# Patient Record
Sex: FEMALE | Race: BLACK OR AFRICAN AMERICAN | Employment: OTHER | URBAN - METROPOLITAN AREA
[De-identification: names, ages, dates, MRNs, and addresses within clinical notes are randomized per-mention and may not be internally consistent; named-entity substitution may affect disease eponyms.]

---

## 2019-04-22 ENCOUNTER — HOSPITAL ENCOUNTER (OUTPATIENT)
Age: 69
Setting detail: OUTPATIENT SURGERY
Discharge: HOME | End: 2019-04-22
Attending: SPECIALIST | Admitting: SPECIALIST
Payer: MEDICARE

## 2019-04-22 ENCOUNTER — ANESTHESIA (OUTPATIENT)
Dept: ENDOSCOPY | Age: 69
End: 2019-04-22
Payer: MEDICARE

## 2019-04-22 ENCOUNTER — ANESTHESIA EVENT (OUTPATIENT)
Dept: ENDOSCOPY | Age: 69
End: 2019-04-22
Payer: MEDICARE

## 2019-04-22 VITALS
TEMPERATURE: 96.8 F | OXYGEN SATURATION: 99 % | HEIGHT: 66 IN | DIASTOLIC BLOOD PRESSURE: 47 MMHG | SYSTOLIC BLOOD PRESSURE: 121 MMHG | WEIGHT: 234 LBS | BODY MASS INDEX: 37.61 KG/M2

## 2019-04-22 PROCEDURE — 74011250636 HC RX REV CODE- 250/636

## 2019-04-22 PROCEDURE — 76060000032 HC ANESTHESIA 0.5 TO 1 HR: Performed by: SPECIALIST

## 2019-04-22 PROCEDURE — 77030009426 HC FCPS BIOP ENDOSC BSC -B: Performed by: SPECIALIST

## 2019-04-22 PROCEDURE — 74011250636 HC RX REV CODE- 250/636: Performed by: SPECIALIST

## 2019-04-22 PROCEDURE — 76040000007: Performed by: SPECIALIST

## 2019-04-22 PROCEDURE — 77030013992 HC SNR POLYP ENDOSC BSC -B: Performed by: SPECIALIST

## 2019-04-22 PROCEDURE — 77030027957 HC TBNG IRR ENDOGTR BUSS -B: Performed by: SPECIALIST

## 2019-04-22 PROCEDURE — 88305 TISSUE EXAM BY PATHOLOGIST: CPT

## 2019-04-22 RX ORDER — FLUTICASONE FUROATE AND VILANTEROL 200; 25 UG/1; UG/1
1 POWDER RESPIRATORY (INHALATION)
COMMUNITY
End: 2021-11-02

## 2019-04-22 RX ORDER — GUAIFENESIN 100 MG/5ML
81 LIQUID (ML) ORAL DAILY
COMMUNITY
End: 2021-11-19

## 2019-04-22 RX ORDER — LATANOPROST 50 UG/ML
1 SOLUTION/ DROPS OPHTHALMIC
COMMUNITY

## 2019-04-22 RX ORDER — LIDOCAINE HYDROCHLORIDE 20 MG/ML
INJECTION, SOLUTION EPIDURAL; INFILTRATION; INTRACAUDAL; PERINEURAL AS NEEDED
Status: DISCONTINUED | OUTPATIENT
Start: 2019-04-22 | End: 2019-04-22 | Stop reason: HOSPADM

## 2019-04-22 RX ORDER — MIDAZOLAM HYDROCHLORIDE 1 MG/ML
.25-1 INJECTION, SOLUTION INTRAMUSCULAR; INTRAVENOUS
Status: DISCONTINUED | OUTPATIENT
Start: 2019-04-22 | End: 2019-04-22 | Stop reason: HOSPADM

## 2019-04-22 RX ORDER — DEXTROMETHORPHAN/PSEUDOEPHED 2.5-7.5/.8
1.2 DROPS ORAL
Status: DISCONTINUED | OUTPATIENT
Start: 2019-04-22 | End: 2019-04-22 | Stop reason: HOSPADM

## 2019-04-22 RX ORDER — PROPOFOL 10 MG/ML
INJECTION, EMULSION INTRAVENOUS AS NEEDED
Status: DISCONTINUED | OUTPATIENT
Start: 2019-04-22 | End: 2019-04-22 | Stop reason: HOSPADM

## 2019-04-22 RX ORDER — EPINEPHRINE 0.1 MG/ML
1 INJECTION INTRACARDIAC; INTRAVENOUS
Status: DISCONTINUED | OUTPATIENT
Start: 2019-04-22 | End: 2019-04-22 | Stop reason: HOSPADM

## 2019-04-22 RX ORDER — SPIRONOLACTONE 25 MG/1
25 TABLET ORAL DAILY
COMMUNITY

## 2019-04-22 RX ORDER — ATROPINE SULFATE 0.1 MG/ML
0.5 INJECTION INTRAVENOUS
Status: DISCONTINUED | OUTPATIENT
Start: 2019-04-22 | End: 2019-04-22 | Stop reason: HOSPADM

## 2019-04-22 RX ORDER — SODIUM CHLORIDE 0.9 % (FLUSH) 0.9 %
5-40 SYRINGE (ML) INJECTION EVERY 8 HOURS
Status: DISCONTINUED | OUTPATIENT
Start: 2019-04-22 | End: 2019-04-22 | Stop reason: HOSPADM

## 2019-04-22 RX ORDER — SODIUM CHLORIDE 0.9 % (FLUSH) 0.9 %
5-40 SYRINGE (ML) INJECTION AS NEEDED
Status: DISCONTINUED | OUTPATIENT
Start: 2019-04-22 | End: 2019-04-22 | Stop reason: HOSPADM

## 2019-04-22 RX ORDER — FENTANYL CITRATE 50 UG/ML
200 INJECTION, SOLUTION INTRAMUSCULAR; INTRAVENOUS
Status: DISCONTINUED | OUTPATIENT
Start: 2019-04-22 | End: 2019-04-22 | Stop reason: HOSPADM

## 2019-04-22 RX ORDER — SODIUM CHLORIDE 9 MG/ML
INJECTION, SOLUTION INTRAVENOUS
Status: DISCONTINUED | OUTPATIENT
Start: 2019-04-22 | End: 2019-04-22 | Stop reason: HOSPADM

## 2019-04-22 RX ORDER — NALOXONE HYDROCHLORIDE 0.4 MG/ML
0.4 INJECTION, SOLUTION INTRAMUSCULAR; INTRAVENOUS; SUBCUTANEOUS
Status: DISCONTINUED | OUTPATIENT
Start: 2019-04-22 | End: 2019-04-22 | Stop reason: HOSPADM

## 2019-04-22 RX ORDER — PRAVASTATIN SODIUM 10 MG/1
TABLET ORAL
COMMUNITY

## 2019-04-22 RX ORDER — SODIUM CHLORIDE 9 MG/ML
50 INJECTION, SOLUTION INTRAVENOUS CONTINUOUS
Status: DISCONTINUED | OUTPATIENT
Start: 2019-04-22 | End: 2019-04-22 | Stop reason: HOSPADM

## 2019-04-22 RX ORDER — FLUMAZENIL 0.1 MG/ML
0.2 INJECTION INTRAVENOUS
Status: DISCONTINUED | OUTPATIENT
Start: 2019-04-22 | End: 2019-04-22 | Stop reason: HOSPADM

## 2019-04-22 RX ORDER — LORAZEPAM 2 MG/ML
2 INJECTION INTRAMUSCULAR AS NEEDED
Status: DISCONTINUED | OUTPATIENT
Start: 2019-04-22 | End: 2019-04-22 | Stop reason: HOSPADM

## 2019-04-22 RX ADMIN — PROPOFOL 50 MG: 10 INJECTION, EMULSION INTRAVENOUS at 08:07

## 2019-04-22 RX ADMIN — PROPOFOL 50 MG: 10 INJECTION, EMULSION INTRAVENOUS at 07:55

## 2019-04-22 RX ADMIN — PROPOFOL 30 MG: 10 INJECTION, EMULSION INTRAVENOUS at 07:37

## 2019-04-22 RX ADMIN — SODIUM CHLORIDE: 9 INJECTION, SOLUTION INTRAVENOUS at 07:31

## 2019-04-22 RX ADMIN — PROPOFOL 50 MG: 10 INJECTION, EMULSION INTRAVENOUS at 07:40

## 2019-04-22 RX ADMIN — PROPOFOL 100 MG: 10 INJECTION, EMULSION INTRAVENOUS at 07:35

## 2019-04-22 RX ADMIN — PROPOFOL 50 MG: 10 INJECTION, EMULSION INTRAVENOUS at 08:03

## 2019-04-22 RX ADMIN — PROPOFOL 50 MG: 10 INJECTION, EMULSION INTRAVENOUS at 07:50

## 2019-04-22 RX ADMIN — PROPOFOL 50 MG: 10 INJECTION, EMULSION INTRAVENOUS at 07:48

## 2019-04-22 RX ADMIN — PROPOFOL 50 MG: 10 INJECTION, EMULSION INTRAVENOUS at 07:43

## 2019-04-22 RX ADMIN — PROPOFOL 50 MG: 10 INJECTION, EMULSION INTRAVENOUS at 07:58

## 2019-04-22 RX ADMIN — PROPOFOL 50 MG: 10 INJECTION, EMULSION INTRAVENOUS at 07:46

## 2019-04-22 RX ADMIN — LIDOCAINE HYDROCHLORIDE 40 MG: 20 INJECTION, SOLUTION EPIDURAL; INFILTRATION; INTRACAUDAL; PERINEURAL at 07:35

## 2019-04-22 RX ADMIN — PROPOFOL 50 MG: 10 INJECTION, EMULSION INTRAVENOUS at 07:52

## 2019-04-22 NOTE — DISCHARGE INSTRUCTIONS
Jose Marina  [id number]  1950    COLON DISCHARGE INSTRUCTIONS  Discomfort:  Redness at IV site- apply warm compress to area; if redness or soreness persist- contact your physician  There may be a slight amount of blood passed from the rectum  Gaseous discomfort- walking, belching will help relieve any discomfort  You may not operate a vehicle for 12 hours  You may not engage in an occupation involving machinery or appliances for rest of today  You may not drink alcoholic beverages for at least 12 hours  Avoid making any critical decisions for at least 24 hour  DIET:   Regular diet. - however -  remember your colon is empty and a heavy meal will produce gas. Avoid these foods:  vegetables, fried / greasy foods, carbonated drinks for today. ACTIVITY:  You may resume your normal daily activities it is recommended that you spend the remainder of the day resting -  avoid any strenuous activity. CALL M.D. ANY SIGN OF:  Increasing pain, nausea, vomiting  Abdominal distension (swelling)  New increased bleeding (oral or rectal)  Fever (chills)  Pain in chest area  Bloody discharge from nose or mouth  Shortness of breath    You may not  take any Advil, Aspirin, Ibuprofen, Motrin, Aleve, Goodys, or any similar pain or arthritis products for 10 days, ONLY  Tylenol as needed for pain.       Follow-up Instructions:   Call Dr. Isak Ballard  Results of procedure / biopsy in 10-14days   Office telephone for problems or questions [phone]    Recommendation for next colonscopy in 3 years  If < 10 years, reason:  above average risk patient   Start Anusol or generic 2 x  A day for 10 days  Make appointment with Dr. Shahab Garay [phone]

## 2019-04-22 NOTE — H&P
Pre-endoscopy H and P for Colonoscopy    The patient was seen and examined. Date of last colonoscopy: 2016, Polyps  Yes      The airway was assessed and documented. The problem list, past medical history, and medications were reviewed. There is no problem list on file for this patient.     Social History     Socioeconomic History    Marital status: SINGLE     Spouse name: Not on file    Number of children: Not on file    Years of education: Not on file    Highest education level: Not on file   Occupational History    Not on file   Social Needs    Financial resource strain: Not on file    Food insecurity:     Worry: Not on file     Inability: Not on file    Transportation needs:     Medical: Not on file     Non-medical: Not on file   Tobacco Use    Smoking status: Former Smoker    Tobacco comment: quit in 1983 or 80   Substance and Sexual Activity    Alcohol use: Yes     Comment: occasionally    Drug use: Not on file    Sexual activity: Not on file   Lifestyle    Physical activity:     Days per week: Not on file     Minutes per session: Not on file    Stress: Not on file   Relationships    Social connections:     Talks on phone: Not on file     Gets together: Not on file     Attends [employer] service: Not on file     Active member of club or organization: Not on file     Attends meetings of clubs or organizations: Not on file     Relationship status: Not on file    Intimate partner violence:     Fear of current or ex partner: Not on file     Emotionally abused: Not on file     Physically abused: Not on file     Forced sexual activity: Not on file   Other Topics Concern    Not on file   Social History Narrative    Not on file     Past Medical History:   Diagnosis Date    Asthma     Cancer ([facility] [address]     cervical - radiation therapy/2 radiation implants    Hypertension     Ill-defined condition     overweight per pt    Ill-defined condition     sarcoidosis - lungs     The patient has a family history of na    Prior to Admission Medications   Prescriptions Last Dose Informant Patient Reported? Taking? ALBUTEROL SULFATE IN 1/22/2019 at Unknown time  Yes Yes   Sig: Take 2 Puffs by inhalation as needed. HFA   CALCIUM CARBONATE/VITAMIN D3 (CALCIUM + D PO) Not Taking at Unknown time  Yes No   Sig: Take  by mouth. Takes one po at night. OTHER Not Taking at Unknown time  Yes No   Sig: Uses another as needed inhaler. Will bring information in. POTASSIUM CHLORIDE PO 4/20/2019  Yes No   Sig: Take 20 mEq by mouth daily. ER   aspirin 81 mg chewable tablet 4/20/2019  Yes Yes   Sig: Take 81 mg by mouth daily. betaxolol (BETOPTIC-S) 0.25 % ophthalmic suspension Not Taking at Unknown time  Yes No   Sig: Administer 1 Drop to both eyes daily. cholecalciferol (VITAMIN D3) 1,000 unit tablet Not Taking at Unknown time  Yes No   Sig: Take 1,000 Units by mouth daily. [facility]) 625 mg tablet Not Taking at Unknown time  Yes No   Sig: Take 1,875 mg by mouth daily. fluticasone furoate-vilanterol (BREO ELLIPTA) 200-25 mcg/dose inhaler 4/20/2019  Yes Yes   Sig: Take 1 Puff by inhalation daily. hydrochlorothiazide (HYDRODIURIL) 25 mg tablet Not Taking at Unknown time  Yes No   Sig: Take 25 mg by mouth daily. hydrocortisone (ANUSOL-HC) 2.5 % rectal cream Not Taking at Unknown time  No No   Sig: Apply with gloved finger BID for 7 days then as neede for bleeding or pain   latanoprost (XALATAN) 0.005 % ophthalmic solution 4/20/2019  Yes Yes   Sig: Administer 1 Drop to both eyes nightly. lisinopril (PRINIVIL, ZESTRIL) 10 mg tablet 4/20/2019  Yes No   Sig: Take 10 mg by mouth daily. pravastatin (PRAVACHOL) 10 mg tablet 4/20/2019  Yes Yes   Sig: Take  by mouth nightly. spironolactone (ALDACTONE) 25 mg tablet 4/20/2019  Yes Yes   Sig: Take 25 mg by mouth daily. vitamin E (AQUA GEMS) 400 unit capsule Not Taking at Unknown time  Yes No   Sig: Take 800 Units by mouth daily.    zafirlukast (ACCOLATE) 20 mg tablet 4/20/2019  Yes No   Sig: Take 20 mg by mouth two (2) times a day. Facility-Administered Medications: None         The review of systems is:  negative for shortness of breath or chest pain      The heart, lungs and mental status were satisfactory for the administration of MAC sedation and for the procedure. Mallampati score: See Anesthesia. I discussed with the patient the objectives, risks, consequences and alternatives to the procedure. Plan: Endoscopic procedure with MAC sedation. Massiel Taylor MD  4/22/2019  7:31 AM    Pre-endoscopy H and P for Colonoscopy    The patient was seen and examined. Date of last colonoscopy: 2016, Polyps  Yes      The airway was assessed and documented. The problem list, past medical history, and medications were reviewed. There is no problem list on file for this patient.     Social History     Socioeconomic History    Marital status: SINGLE     Spouse name: Not on file    Number of children: Not on file    Years of education: Not on file    Highest education level: Not on file   Occupational History    Not on file   Social Needs    Financial resource strain: Not on file    Food insecurity:     Worry: Not on file     Inability: Not on file    Transportation needs:     Medical: Not on file     Non-medical: Not on file   Tobacco Use    Smoking status: Former Smoker    Tobacco comment: quit in 1983 or 80   Substance and Sexual Activity    Alcohol use: Yes     Comment: occasionally    Drug use: Not on file    Sexual activity: Not on file   Lifestyle    Physical activity:     Days per week: Not on file     Minutes per session: Not on file    Stress: Not on file   Relationships    Social connections:     Talks on phone: Not on file     Gets together: Not on file     Attends [employer] service: Not on file     Active member of club or organization: Not on file     Attends meetings of clubs or organizations: Not on file     Relationship status: Not on file    Intimate partner violence:     Fear of current or ex partner: Not on file     Emotionally abused: Not on file     Physically abused: Not on file     Forced sexual activity: Not on file   Other Topics Concern    Not on file   Social History Narrative    Not on file     Past Medical History:   Diagnosis Date    Asthma     Cancer ([facility] [address]     cervical - radiation therapy/2 radiation implants    Hypertension     Ill-defined condition     overweight per pt    Ill-defined condition     sarcoidosis - lungs     The patient has a family history of na    Prior to Admission Medications   Prescriptions Last Dose Informant Patient Reported? Taking? ALBUTEROL SULFATE IN 1/22/2019 at Unknown time  Yes Yes   Sig: Take 2 Puffs by inhalation as needed. HFA   CALCIUM CARBONATE/VITAMIN D3 (CALCIUM + D PO) Not Taking at Unknown time  Yes No   Sig: Take  by mouth. Takes one po at night. OTHER Not Taking at Unknown time  Yes No   Sig: Uses another as needed inhaler. Will bring information in. POTASSIUM CHLORIDE PO 4/20/2019  Yes No   Sig: Take 20 mEq by mouth daily. ER   aspirin 81 mg chewable tablet 4/20/2019  Yes Yes   Sig: Take 81 mg by mouth daily. betaxolol (BETOPTIC-S) 0.25 % ophthalmic suspension Not Taking at Unknown time  Yes No   Sig: Administer 1 Drop to both eyes daily. cholecalciferol (VITAMIN D3) 1,000 unit tablet Not Taking at Unknown time  Yes No   Sig: Take 1,000 Units by mouth daily. [facility]) 625 mg tablet Not Taking at Unknown time  Yes No   Sig: Take 1,875 mg by mouth daily. fluticasone furoate-vilanterol (BREO ELLIPTA) 200-25 mcg/dose inhaler 4/20/2019  Yes Yes   Sig: Take 1 Puff by inhalation daily. hydrochlorothiazide (HYDRODIURIL) 25 mg tablet Not Taking at Unknown time  Yes No   Sig: Take 25 mg by mouth daily.    hydrocortisone (ANUSOL-HC) 2.5 % rectal cream Not Taking at Unknown time  No No   Sig: Apply with gloved finger BID for 7 days then as neede for bleeding or pain   latanoprost (XALATAN) 0.005 % ophthalmic solution 4/20/2019  Yes Yes   Sig: Administer 1 Drop to both eyes nightly. lisinopril (PRINIVIL, ZESTRIL) 10 mg tablet 4/20/2019  Yes No   Sig: Take 10 mg by mouth daily. pravastatin (PRAVACHOL) 10 mg tablet 4/20/2019  Yes Yes   Sig: Take  by mouth nightly. spironolactone (ALDACTONE) 25 mg tablet 4/20/2019  Yes Yes   Sig: Take 25 mg by mouth daily. vitamin E (AQUA GEMS) 400 unit capsule Not Taking at Unknown time  Yes No   Sig: Take 800 Units by mouth daily. zafirlukast (ACCOLATE) 20 mg tablet 4/20/2019  Yes No   Sig: Take 20 mg by mouth two (2) times a day. Facility-Administered Medications: None         The review of systems is:  negative for shortness of breath or chest pain      The heart, lungs and mental status were satisfactory for the administration of MAC sedation and for the procedure. Mallampati score: See Anesthesia. I discussed with the patient the objectives, risks, consequences and alternatives to the procedure. Plan: Endoscopic procedure with MAC sedation.     Kervin Hauser MD  4/22/2019  7:31 AM

## 2019-04-22 NOTE — ROUTINE PROCESS
Alexia Vahe  1950  [id number]    Situation:  Verbal report received from: Orval Sacks  Procedure: Procedure(s):  COLONOSCOPY  ENDOSCOPIC POLYPECTOMY  COLON BIOPSY    Background:    Preoperative diagnosis: HX COLON POLYPS  Postoperative diagnosis: 1. External Hemorrhoids  2. Severe Distal Sigmoid Tortuosity   3. Rectal Polyp  4. Ascending Colon Polyps x 2  5. Rectal Ulcer    :  Dr. Asa Han  Assistant(s): Endoscopy Technician-1: Celsa Keyes  Endoscopy RN-1: Desiree Moran    Specimens:   ID Type Source Tests Collected by Time Destination   1 : Proximal rectal polyp Preservative Rectum  Frankie Escobar MD 4/22/2019 2370 Pathology   2 : ascending colon polyp Preservative Colon, Ascending  Frankie Escobar MD 4/22/2019 1256 Pathology   3 : distal Rectal Ulcer bx Preservative Rectum  Frankie Escobar MD 4/22/2019 1435 Pathology     H. Pylori  no    Assessment:  Intra-procedure medications     Anesthesia gave intra-procedure sedation and medications, see anesthesia flow sheet yes    Intravenous fluids: NS@ KVO     Vital signs stable   yes    Abdominal assessment: round and soft  yes    Recommendation:  Discharge patient per MD order yes .   Return to floor na   Family or Friend fam  Permission to share finding with family or friend yes

## 2019-04-22 NOTE — PERIOP NOTES

## 2019-04-22 NOTE — ANESTHESIA PREPROCEDURE EVALUATION
Relevant Problems   No relevant active problems       Anesthetic History               Review of Systems / Medical History  Patient summary reviewed, nursing notes reviewed and pertinent labs reviewed    Pulmonary            Asthma     Comments: sarcoid   Neuro/Psych              Cardiovascular    Hypertension              Exercise tolerance: >4 METS     GI/Hepatic/Renal                Endo/Other  Within defined limits           Other Findings            Physical Exam    Airway  Mallampati: I  TM Distance: > 6 cm  Neck ROM: normal range of motion   Mouth opening: Normal     Cardiovascular    Rhythm: regular  Rate: normal         Dental  No notable dental hx       Pulmonary  Breath sounds clear to auscultation               Abdominal         Other Findings            Anesthetic Plan    ASA: 3  Anesthesia type: MAC          Induction: Intravenous  Anesthetic plan and risks discussed with: Patient

## 2019-04-22 NOTE — PROCEDURES
Colonoscopy Procedure Note    Indications:   Personal history of colon polyps (screening only), s/p radiation for cervical ca patient not sure she had MELINDA and BSO, hx of radiation changes in rectum  Referring Physician: Julio Almeida, Not On File   Anesthesia/Sedation:DIMA  Endoscopist:  Dr. Coyle Smoker  Assistant:  Endoscopy Technician-1: Christina Salinas  Endoscopy RN-1: Alexander Mckinney  Surgical Assistant: None  Implants: None    Preoperative diagnosis: HX COLON POLYPS    Postoperative diagnosis: 1. External Hemorrhoids  2. Severe Distal Sigmoid Tortuosity   3. Rectal Polyp  4. Ascending Colon Polyps x 2  5. Rectal Ulcer      Procedure in Detail:  Informed consent was obtained for the procedure, including sedation. Risks of perforation, hemorrhage, adverse drug reaction, and aspiration were discussed. The patient was placed in the left lateral decubitus position. Based on the pre-procedure assessment, including review of the patient's medical history, medications, allergies, and review of systems, she had been deemed to be an appropriate candidate for moderate sedation; she was therefore sedated with the medications listed above. The patient was monitored continuously with ECG tracing, pulse oximetry, blood pressure monitoring, and direct observations. A rectal examination was performed. The [id number] was inserted into the rectum and advanced under direct vision to the terminal ileum. The quality of the colonic preparation was excellent. A careful inspection was made as the colonoscope was withdrawn, including a retroflexed view of the rectum; findings and interventions are described below.       Findings:   Rectum: easy friability w/o vascular ectasias, chronic ulcer just above pectinate lineat 4 o'clock in LLD position- Bx, large nontender external hemorrhoid; question of 3 mm polyp at pectinate line not removed -refer to colorectal; 3 mm polyp of proximal rectum removed with hot snare  Sigmoid:very fixed tortuous but short segment of distal rectosigmoid only able to negotiate with thin colonoscope  Descending Colon: normal  Transverse Colon: normal  Ascending Colon: 3 mm polyp removed with hot snare and 2 mm polyp removed with cold forceps submitted together  Cecum: normal  Terminal Ileum: normal    Specimens:     see above    EBL: None    Complications: None; patient tolerated the procedure well. Recommendations:     - Await pathology. - Repeat colonoscopy in 5 years.      - If < 10 years, reason: above average risk patient     - Refer to colorectal surgey regardin anal canal polyp, ulcer and external hemorrhoid    Signed By: Girish Mitchell MD                        April 22, 2019

## 2019-04-22 NOTE — ANESTHESIA POSTPROCEDURE EVALUATION
Post-Anesthesia Evaluation and Assessment Patient: Nadine Condon MRN: [id number]  SSN: [id number] YOB: 1950  Age: 76 y.o. Sex: female I have evaluated the patient and they are stable and ready for discharge from the PACU. Cardiovascular Function/Vital Signs Visit Vitals /57 Pulse 77 Temp 36 °C (96.8 °F) Resp 18 Ht 5' 6\" (1.676 m) Wt 106.1 kg (234 lb) SpO2 100% BMI 37.77 kg/m² Patient is status post MAC anesthesia for Procedure(s): 
COLONOSCOPY 
ENDOSCOPIC POLYPECTOMY 
COLON BIOPSY. Nausea/Vomiting: None Postoperative hydration reviewed and adequate. Pain: 
Pain Scale 1: Numeric (0 - 10) (04/22/19 0818) Pain Intensity 1: 0 (04/22/19 0819) Managed Neurological Status: At baseline Mental Status, Level of Consciousness: Alert and  oriented to person, place, and time Pulmonary Status:  
O2 Device: Nasal cannula (04/22/19 0803) Adequate oxygenation and airway patent Complications related to anesthesia: None Post-anesthesia assessment completed. No concerns Signed By: Priscila Montero MD   
 April 22, 2019 Procedure(s): 
COLONOSCOPY 
ENDOSCOPIC POLYPECTOMY 
COLON BIOPSY. MAC 
 
<BSHSIANPOST> Vitals Value Taken Time /64 4/22/2019  8:20 AM  
Temp Pulse 75 4/22/2019  8:24 AM  
Resp 15 4/22/2019  8:24 AM  
SpO2 99 % 4/22/2019  8:24 AM  
Vitals shown include unvalidated device data.

## 2019-05-17 ENCOUNTER — HOSPITAL ENCOUNTER (OUTPATIENT)
Dept: PREADMISSION TESTING | Age: 69
Discharge: HOME | End: 2019-05-17
Payer: MEDICARE

## 2019-05-17 VITALS
WEIGHT: 226.25 LBS | HEART RATE: 89 BPM | SYSTOLIC BLOOD PRESSURE: 160 MMHG | DIASTOLIC BLOOD PRESSURE: 74 MMHG | HEIGHT: 66 IN | BODY MASS INDEX: 36.36 KG/M2 | RESPIRATION RATE: 20 BRPM | TEMPERATURE: 98.2 F

## 2019-05-17 LAB
ANION GAP SERPL CALC-SCNC: 6 MMOL/L (ref 5–15)
BASOPHILS # BLD: 0 K/UL (ref 0–0.1)
BASOPHILS NFR BLD: 0 % (ref 0–1)
BUN SERPL-MCNC: 11 MG/DL (ref 6–20)
BUN/CREAT SERPL: 15 (ref 12–20)
CALCIUM SERPL-MCNC: 9.1 MG/DL (ref 8.5–10.1)
CHLORIDE SERPL-SCNC: 107 MMOL/L (ref 97–108)
CO2 SERPL-SCNC: 29 MMOL/L (ref 21–32)
CREAT SERPL-MCNC: 0.73 MG/DL (ref 0.55–1.02)
DIFFERENTIAL METHOD BLD: ABNORMAL
EOSINOPHIL # BLD: 0.2 K/UL (ref 0–0.4)
EOSINOPHIL NFR BLD: 3 % (ref 0–7)
ERYTHROCYTE [DISTWIDTH] IN BLOOD BY AUTOMATED COUNT: 13.5 % (ref 11.5–14.5)
GLUCOSE SERPL-MCNC: 89 MG/DL (ref 65–100)
HCT VFR BLD AUTO: 40.9 % (ref 35–47)
HGB BLD-MCNC: 13.2 G/DL (ref 11.5–16)
IMM GRANULOCYTES # BLD AUTO: 0 K/UL (ref 0–0.04)
IMM GRANULOCYTES NFR BLD AUTO: 1 % (ref 0–0.5)
LYMPHOCYTES # BLD: 1.2 K/UL (ref 0.8–3.5)
LYMPHOCYTES NFR BLD: 23 % (ref 12–49)
MCH RBC QN AUTO: 29.5 PG (ref 26–34)
MCHC RBC AUTO-ENTMCNC: 32.3 G/DL (ref 30–36.5)
MCV RBC AUTO: 91.5 FL (ref 80–99)
MONOCYTES # BLD: 0.4 K/UL (ref 0–1)
MONOCYTES NFR BLD: 7 % (ref 5–13)
NEUTS SEG # BLD: 3.5 K/UL (ref 1.8–8)
NEUTS SEG NFR BLD: 66 % (ref 32–75)
NRBC # BLD: 0 K/UL (ref 0–0.01)
NRBC BLD-RTO: 0 PER 100 WBC
PLATELET # BLD AUTO: 203 K/UL (ref 150–400)
PMV BLD AUTO: 10.5 FL (ref 8.9–12.9)
POTASSIUM SERPL-SCNC: 3.9 MMOL/L (ref 3.5–5.1)
RBC # BLD AUTO: 4.47 M/UL (ref 3.8–5.2)
SODIUM SERPL-SCNC: 142 MMOL/L (ref 136–145)
WBC # BLD AUTO: 5.3 K/UL (ref 3.6–11)

## 2019-05-17 PROCEDURE — 36415 COLL VENOUS BLD VENIPUNCTURE: CPT

## 2019-05-17 PROCEDURE — 93005 ELECTROCARDIOGRAM TRACING: CPT

## 2019-05-17 PROCEDURE — 80048 BASIC METABOLIC PNL TOTAL CA: CPT

## 2019-05-17 PROCEDURE — 85025 COMPLETE CBC W/AUTO DIFF WBC: CPT

## 2019-05-17 RX ORDER — ALBUTEROL SULFATE 90 UG/1
2 AEROSOL, METERED RESPIRATORY (INHALATION) AS NEEDED
COMMUNITY

## 2019-05-18 LAB
ATRIAL RATE: 83 BPM
CALCULATED P AXIS, ECG09: 53 DEGREES
CALCULATED R AXIS, ECG10: -37 DEGREES
CALCULATED T AXIS, ECG11: 21 DEGREES
DIAGNOSIS, 93000: NORMAL
P-R INTERVAL, ECG05: 164 MS
Q-T INTERVAL, ECG07: 410 MS
QRS DURATION, ECG06: 142 MS
QTC CALCULATION (BEZET), ECG08: 481 MS
VENTRICULAR RATE, ECG03: 83 BPM

## 2021-11-02 ENCOUNTER — HOSPITAL ENCOUNTER (OUTPATIENT)
Dept: PREADMISSION TESTING | Age: 71
Discharge: HOME | End: 2021-11-02
Attending: ORTHOPAEDIC SURGERY
Payer: MEDICARE

## 2021-11-02 VITALS
HEART RATE: 92 BPM | BODY MASS INDEX: 36.17 KG/M2 | TEMPERATURE: 97.3 F | WEIGHT: 225.09 LBS | HEIGHT: 66 IN | DIASTOLIC BLOOD PRESSURE: 67 MMHG | SYSTOLIC BLOOD PRESSURE: 150 MMHG | RESPIRATION RATE: 16 BRPM | OXYGEN SATURATION: 95 %

## 2021-11-02 LAB
25(OH)D3 SERPL-MCNC: 16.7 NG/ML (ref 30–100)
ABO + RH BLD: NORMAL
ALBUMIN SERPL-MCNC: 3.2 G/DL (ref 3.5–5)
ALBUMIN/GLOB SERPL: 0.9 {RATIO} (ref 1.1–2.2)
ALP SERPL-CCNC: 87 U/L (ref 45–117)
ALT SERPL-CCNC: 29 U/L (ref 12–78)
ANION GAP SERPL CALC-SCNC: 4 MMOL/L (ref 5–15)
APPEARANCE UR: CLEAR
APTT PPP: 25.5 SEC (ref 22.1–31)
AST SERPL-CCNC: 20 U/L (ref 15–37)
ATRIAL RATE: 83 BPM
BACTERIA URNS QL MICRO: NEGATIVE /HPF
BASOPHILS # BLD: 0 K/UL (ref 0–0.1)
BASOPHILS NFR BLD: 0 % (ref 0–1)
BILIRUB SERPL-MCNC: 0.6 MG/DL (ref 0.2–1)
BILIRUB UR QL: NEGATIVE
BLOOD GROUP ANTIBODIES SERPL: NORMAL
BUN SERPL-MCNC: 12 MG/DL (ref 6–20)
BUN/CREAT SERPL: 19 (ref 12–20)
CALCIUM SERPL-MCNC: 9.5 MG/DL (ref 8.5–10.1)
CALCULATED P AXIS, ECG09: 59 DEGREES
CALCULATED R AXIS, ECG10: -50 DEGREES
CALCULATED T AXIS, ECG11: 51 DEGREES
CHLORIDE SERPL-SCNC: 107 MMOL/L (ref 97–108)
CO2 SERPL-SCNC: 30 MMOL/L (ref 21–32)
COLOR UR: ABNORMAL
CREAT SERPL-MCNC: 0.62 MG/DL (ref 0.55–1.02)
DIAGNOSIS, 93000: NORMAL
DIFFERENTIAL METHOD BLD: ABNORMAL
EOSINOPHIL # BLD: 0 K/UL (ref 0–0.4)
EOSINOPHIL NFR BLD: 1 % (ref 0–7)
EPITH CASTS URNS QL MICRO: ABNORMAL /LPF
ERYTHROCYTE [DISTWIDTH] IN BLOOD BY AUTOMATED COUNT: 14.6 % (ref 11.5–14.5)
EST. AVERAGE GLUCOSE BLD GHB EST-MCNC: 126 MG/DL
GLOBULIN SER CALC-MCNC: 3.6 G/DL (ref 2–4)
GLUCOSE SERPL-MCNC: 87 MG/DL (ref 65–100)
GLUCOSE UR STRIP.AUTO-MCNC: NEGATIVE MG/DL
HBA1C MFR BLD: 6 % (ref 4–5.6)
HCT VFR BLD AUTO: 42.3 % (ref 35–47)
HGB BLD-MCNC: 13.6 G/DL (ref 11.5–16)
HGB UR QL STRIP: NEGATIVE
IMM GRANULOCYTES # BLD AUTO: 0.1 K/UL (ref 0–0.04)
IMM GRANULOCYTES NFR BLD AUTO: 1 % (ref 0–0.5)
INR PPP: 1 (ref 0.9–1.1)
KETONES UR QL STRIP.AUTO: NEGATIVE MG/DL
LEUKOCYTE ESTERASE UR QL STRIP.AUTO: ABNORMAL
LYMPHOCYTES # BLD: 1.7 K/UL (ref 0.8–3.5)
LYMPHOCYTES NFR BLD: 24 % (ref 12–49)
MCH RBC QN AUTO: 29.5 PG (ref 26–34)
MCHC RBC AUTO-ENTMCNC: 32.2 G/DL (ref 30–36.5)
MCV RBC AUTO: 91.8 FL (ref 80–99)
MONOCYTES # BLD: 0.4 K/UL (ref 0–1)
MONOCYTES NFR BLD: 6 % (ref 5–13)
NEUTS SEG # BLD: 4.9 K/UL (ref 1.8–8)
NEUTS SEG NFR BLD: 68 % (ref 32–75)
NITRITE UR QL STRIP.AUTO: NEGATIVE
NRBC # BLD: 0 K/UL (ref 0–0.01)
NRBC BLD-RTO: 0 PER 100 WBC
P-R INTERVAL, ECG05: 142 MS
PH UR STRIP: 6.5 [PH] (ref 5–8)
PLATELET # BLD AUTO: 218 K/UL (ref 150–400)
PMV BLD AUTO: 9.9 FL (ref 8.9–12.9)
POTASSIUM SERPL-SCNC: 4.4 MMOL/L (ref 3.5–5.1)
PROT SERPL-MCNC: 6.8 G/DL (ref 6.4–8.2)
PROT UR STRIP-MCNC: NEGATIVE MG/DL
PROTHROMBIN TIME: 10.1 SEC (ref 9–11.1)
Q-T INTERVAL, ECG07: 394 MS
QRS DURATION, ECG06: 142 MS
QTC CALCULATION (BEZET), ECG08: 462 MS
RBC # BLD AUTO: 4.61 M/UL (ref 3.8–5.2)
RBC #/AREA URNS HPF: ABNORMAL /HPF (ref 0–5)
SODIUM SERPL-SCNC: 141 MMOL/L (ref 136–145)
SP GR UR REFRACTOMETRY: 1.01 (ref 1–1.03)
SPECIMEN EXP DATE BLD: NORMAL
THERAPEUTIC RANGE,PTTT: NORMAL SECS (ref 58–77)
UA: UC IF INDICATED,UAUC: ABNORMAL
UROBILINOGEN UR QL STRIP.AUTO: 1 EU/DL (ref 0.2–1)
VENTRICULAR RATE, ECG03: 83 BPM
WBC # BLD AUTO: 7.1 K/UL (ref 3.6–11)
WBC URNS QL MICRO: ABNORMAL /HPF (ref 0–4)

## 2021-11-02 PROCEDURE — 85610 PROTHROMBIN TIME: CPT

## 2021-11-02 PROCEDURE — 36415 COLL VENOUS BLD VENIPUNCTURE: CPT

## 2021-11-02 PROCEDURE — 93005 ELECTROCARDIOGRAM TRACING: CPT

## 2021-11-02 PROCEDURE — 82306 VITAMIN D 25 HYDROXY: CPT

## 2021-11-02 PROCEDURE — 81001 URINALYSIS AUTO W/SCOPE: CPT

## 2021-11-02 PROCEDURE — 83036 HEMOGLOBIN GLYCOSYLATED A1C: CPT

## 2021-11-02 PROCEDURE — 85730 THROMBOPLASTIN TIME PARTIAL: CPT

## 2021-11-02 PROCEDURE — 86901 BLOOD TYPING SEROLOGIC RH(D): CPT

## 2021-11-02 PROCEDURE — 80053 COMPREHEN METABOLIC PANEL: CPT

## 2021-11-02 PROCEDURE — 85025 COMPLETE CBC W/AUTO DIFF WBC: CPT

## 2021-11-02 RX ORDER — FLUTICASONE PROPIONATE AND SALMETEROL 100; 50 UG/1; UG/1
2 POWDER RESPIRATORY (INHALATION) EVERY 12 HOURS
COMMUNITY

## 2021-11-02 RX ORDER — MONTELUKAST SODIUM 10 MG/1
10 TABLET ORAL DAILY
COMMUNITY

## 2021-11-02 NOTE — H&P
Preoperative Evaluation                     History and Physical with Surgical Risk Stratification     11/2/2021    CC: Low back pain    HPI:   Karin Soto is a 70 y.o. female referred for pre-operative evaluation by Dr. Celeste Nguyen for surgery on 11/16/21. Sai Hansen notes she has had low back pain for several years but it did not have severe pain until recently. She had a knee surgery earlier this year but notes her walking is now worse. She is using a walker for safety. She notes low back pain with left leg numbness. She had a fall end of 2019. Movement and standing sometimes makes the pain better. Laying flat on her bed makes her pain worse. She denies any cardiac history, CP or SOB. She does have sarcoidosis that she sees Dr. Rubi Gallegos for with pulmonary. She is stable at this time. The patient was evaluated in the surgeon's office and it was determined that the most appropriate plan of care is to proceed with surgical intervention. Patient's PCP Colby Sethi MD    Review of Systems     Constitutional: Negative for chills and fever  Throat: Negative for congestion and sore throat  Eyes: Negative for blurred vision and double vision  Respiratory: Negative for cough, shortness of breath and wheezing  Mouth: Negative for loose, broken or chipped teeth. Missing teeth  Cardiovascular: Negative for chest pain and palpitations  Gastrointestinal: Negative for abdominal pain, nausea, diarrhea & constipation  Genitourinary: Negative for dysuria and hematuria  Musculoskeletal: Low back pain  Skin: Negative for rash, open wounds.    Neurological: Negative for dizziness, headaches, tremors  Psychiatric: Negative for anxiety    Inherent Risk of Surgery     Surgical risk:  Intermediate  Intermediate:  Joint Replacement, Spinal surgery, abdominal, thoracic, carotid endarterctomy, prostate, head and neck    Patient Cardiac Risk Assessment     Revised Cardiac Risk Index (RCRI)    Rate if cardiac death, nonfatal MI, nonfatal cardiac arrest by number of risk factor- 0.4%    RIMMA/AHA 2007 Guidelines:   1) Surgery Emergency, Non-cardiac -> to surgery  2) If not, look at clinical predictors    Intermediate Minor     Blood Thinner: ASA    METS      EQUAL TO 4 Care for self Walk indoors around house Walk 2-3 blocks on level ground (2-3 mph) Light work around house (dust, dishes)     Other Risk Factors:   Screening for ETOH use:  Done and low risk  Smoking status:  Former   Marijuana Use:  No    Personal or FH of bleeding problems:  No  Personal or FH of blood clots:  No  Personal or FH of anesthesia problems:   No    Pulmonary Risk:  Asthma or COPD:  YES  Body mass index is 36.33 kg/m². Known CORRINE:  No    Past Medical, Surgical, Social History     Allergies: No Known Allergies      Current Outpatient Medications:     elderberry fruit (ELDERBERRY PO), Take 1 Tablet by mouth daily. , Disp: , Rfl:     fluticasone propion-salmeteroL (Wixela Inhub) 100-50 mcg/dose diskus inhaler, Take 2 Puffs by inhalation every twelve (12) hours. , Disp: , Rfl:     montelukast (SINGULAIR) 10 mg tablet, Take 10 mg by mouth daily. , Disp: , Rfl:     albuterol (PROAIR HFA) 90 mcg/actuation inhaler, Take 2 Puffs by inhalation as needed for Wheezing., Disp: , Rfl:     spironolactone (ALDACTONE) 25 mg tablet, Take 25 mg by mouth daily. , Disp: , Rfl:     pravastatin (PRAVACHOL) 10 mg tablet, Take  by mouth nightly., Disp: , Rfl:     latanoprost (XALATAN) 0.005 % ophthalmic solution, Administer 1 Drop to both eyes nightly., Disp: , Rfl:     aspirin 81 mg chewable tablet, Take 81 mg by mouth daily. , Disp: , Rfl:     lisinopril (PRINIVIL, ZESTRIL) 10 mg tablet, Take 10 mg by mouth nightly., Disp: , Rfl:     cholecalciferol (Vitamin D3) (5000 Units/125 mcg) tab tablet, Take 2 Tablets by mouth daily for 30 days.  Indications: low vitamin D levels, Disp: 60 Tablet, Rfl: 0     Past Medical History:   Diagnosis Date    Asthma     [facility]     Radiation    Glaucoma     Hypertension     Low vitamin D level 2021    Sarcoidosis, lung (HCC)      Past Surgical History:   Procedure Laterality Date    COLONOSCOPY Left 2019    COLONOSCOPY performed by Court William MD at [facility] ENDOSCOPY    HX GI  2019    COLONOSCOPY    HX GYN      cervical radiation x 2    HX HEENT      mole removal from  eyelid    HX ORTHOPAEDIC Left     HEEL SPURS    HX TENDON / LIGAMENT TRANSPLANT Left 2021    Knee-     HX UROLOGICAL      BLADDER SLING    OR CHEST SURGERY PROCEDURE UNLISTED      LUNG BIOPSY SARCOIDOSIS     Social History     Tobacco Use    Smoking status: Former Smoker     Packs/day: 1.00     Years: 7.00     Pack years: 7.00     Types: Cigarettes     Quit date: 1983     Years since quittin.4    Smokeless tobacco: Never Used   Substance Use Topics    Alcohol use: Yes     Comment: occasionally    Drug use: Never     Family History   Problem Relation Age of Onset   Matthew Carroll Pacemaker Mother     Heart Disease Mother     Dementia Mother     Cancer Sister         BREAST    Arthritis-rheumatoid Sister     Cancer Father         LIVER    Cancer Brother         BRAIN    No Known Problems Sister     Anesth Problems Neg Hx        Objective     Vitals:    21 1400   BP: (!) 150/67   Pulse: 92   Resp: 16   Temp: 97.3 °F (36.3 °C)   SpO2: 95%   Weight: 102.1 kg (225 lb 1.4 oz)   Height: 5' 6\" (1.676 m)       General Appearance: Alert, Well Appearing and in no distress  Mental Status: Normal mood, behavior, speech and dress  Neck: Normal appearance externally  Ears: External canal no drainage  Nose: Normal external appearance and no drainage   Chest: Clear to auscultation, no wheezes, rales or rhonchi  Heart: Normal rate, regular rhythm, no murmurs, rubs, clicks or gallops  Skin: Normal color, no lesions externally  Abdomen: Not examined  Neuro: Not examined  Musculoskeletal: Gait antalgic    Recent Results (from the past 168 hour(s))   CBC WITH AUTOMATED DIFF    Collection Time: 11/02/21  2:46 PM   Result Value Ref Range    WBC 7.1 3.6 - 11.0 K/uL    RBC 4.61 3.80 - 5.20 M/uL    HGB 13.6 11.5 - 16.0 g/dL    HCT 42.3 35.0 - 47.0 %    MCV 91.8 80.0 - 99.0 FL    MCH 29.5 26.0 - 34.0 PG    MCHC 32.2 30.0 - 36.5 g/dL    RDW 14.6 (H) 11.5 - 14.5 %    PLATELET 709 801 - 500 K/uL    MPV 9.9 8.9 - 12.9 FL    NRBC 0.0 0  WBC    ABSOLUTE NRBC 0.00 0.00 - 0.01 K/uL    NEUTROPHILS 68 32 - 75 %    LYMPHOCYTES 24 12 - 49 %    MONOCYTES 6 5 - 13 %    EOSINOPHILS 1 0 - 7 %    BASOPHILS 0 0 - 1 %    IMMATURE GRANULOCYTES 1 (H) 0.0 - 0.5 %    ABS. NEUTROPHILS 4.9 1.8 - 8.0 K/UL    ABS. LYMPHOCYTES 1.7 0.8 - 3.5 K/UL    ABS. MONOCYTES 0.4 0.0 - 1.0 K/UL    ABS. EOSINOPHILS 0.0 0.0 - 0.4 K/UL    ABS. BASOPHILS 0.0 0.0 - 0.1 K/UL    ABS. IMM. GRANS. 0.1 (H) 0.00 - 0.04 K/UL    DF AUTOMATED     METABOLIC PANEL, COMPREHENSIVE    Collection Time: 11/02/21  2:46 PM   Result Value Ref Range    Sodium 141 136 - 145 mmol/L    Potassium 4.4 3.5 - 5.1 mmol/L    Chloride 107 97 - 108 mmol/L    CO2 30 21 - 32 mmol/L    Anion gap 4 (L) 5 - 15 mmol/L    Glucose 87 65 - 100 mg/dL    BUN 12 6 - 20 MG/DL    Creatinine 0.62 0.55 - 1.02 MG/DL    BUN/Creatinine ratio 19 12 - 20      GFR est AA >60 >60 ml/min/1.73m2    GFR est non-AA >60 >60 ml/min/1.73m2    Calcium 9.5 8.5 - 10.1 MG/DL    Bilirubin, total 0.6 0.2 - 1.0 MG/DL    ALT (SGPT) 29 12 - 78 U/L    AST (SGOT) 20 15 - 37 U/L    Alk.  phosphatase 87 45 - 117 U/L    Protein, total 6.8 6.4 - 8.2 g/dL    Albumin 3.2 (L) 3.5 - 5.0 g/dL    Globulin 3.6 2.0 - 4.0 g/dL    A-G Ratio 0.9 (L) 1.1 - 2.2     HEMOGLOBIN A1C WITH EAG    Collection Time: 11/02/21  2:46 PM   Result Value Ref Range    Hemoglobin A1c 6.0 (H) 4.0 - 5.6 %    Est. average glucose 126 mg/dL   CULTURE, MRSA    Collection Time: 11/02/21  2:46 PM    Specimen: Nares; Nasal   Result Value Ref Range    Special Requests: NO SPECIAL REQUESTS Culture result: MRSA NOT PRESENT      Culture result:        Screening of patient nares for MRSA is for surveillance purposes and, if positive, to facilitate isolation considerations in high risk settings. It is not intended for automatic decolonization interventions per se as regimens are not sufficiently effective to warrant routine use.    PROTHROMBIN TIME + INR    Collection Time: 11/02/21  2:46 PM   Result Value Ref Range    INR 1.0 0.9 - 1.1      Prothrombin time 10.1 9.0 - 11.1 sec   PTT    Collection Time: 11/02/21  2:46 PM   Result Value Ref Range    aPTT 25.5 22.1 - 31.0 sec    aPTT, therapeutic range     58.0 - 77.0 SECS   URINALYSIS W/ REFLEX CULTURE    Collection Time: 11/02/21  2:46 PM    Specimen: Urine   Result Value Ref Range    Color YELLOW/STRAW      Appearance CLEAR CLEAR      Specific gravity 1.015 1.003 - 1.030      pH (UA) 6.5 5.0 - 8.0      Protein Negative NEG mg/dL    Glucose Negative NEG mg/dL    Ketone Negative NEG mg/dL    Bilirubin Negative NEG      Blood Negative NEG      Urobilinogen 1.0 0.2 - 1.0 EU/dL    Nitrites Negative NEG      Leukocyte Esterase MODERATE (A) NEG      WBC 0-4 0 - 4 /hpf    RBC 0-5 0 - 5 /hpf    Epithelial cells FEW FEW /lpf    Bacteria Negative NEG /hpf    UA:UC IF INDICATED CULTURE NOT INDICATED BY UA RESULT CNI     VITAMIN D, 25 HYDROXY    Collection Time: 11/02/21  2:46 PM   Result Value Ref Range    Vitamin D 25-Hydroxy 16.7 (L) 30 - 100 ng/mL   TYPE & SCREEN    Collection Time: 11/02/21  2:46 PM   Result Value Ref Range    Crossmatch Expiration 11/16/2021,2359     ABO/Rh(D) O POSITIVE     Antibody screen NEG    EKG, 12 LEAD, INITIAL    Collection Time: 11/02/21  3:25 PM   Result Value Ref Range    Ventricular Rate 83 BPM    Atrial Rate 83 BPM    P-R Interval 142 ms    QRS Duration 142 ms    Q-T Interval 394 ms    QTC Calculation (Bezet) 462 ms    Calculated P Axis 59 degrees    Calculated R Axis -50 degrees    Calculated T Axis 51 degrees    Diagnosis Normal sinus rhythm  Right bundle branch block  Left anterior fascicular block  ** Bifascicular block **  Minimal voltage criteria for LVH, may be normal variant  Abnormal ECG  When compared with ECG of 17-MAY-2019 13:37,  No significant change was found  Confirmed by Tyler Fair ([id number]) on 11/2/2021 8:15:37 PM         Assessment and Plan     Assessment/Plan:   1) Lumbar Stenosis       Pre-Operative Evaluation    Plan:  L2-5 Laminectomy, L2-5 Fusion  Labs and EKG reviewed. MRSA negative  Vitamin D treated      Preoperative Risk Stratification:    Per RCRI, the patient has a 0.4% risk of cardiac death, nonfatal MI, nonfatal cardiac arrest based on no risk factors. Per ACC/AHA guidelines, patient is low risk for a(n) intermediate risk surgery and may proceed to planned surgery with the above noted risk.     Demario Santacruz NP

## 2021-11-02 NOTE — PERIOP NOTES
[address], [address] [facility]   MAIN OR                                  [phone]   MAIN PRE OP                          [phone]                                                                                AMBULATORY PRE OP          [phone]  PRE-ADMISSION TESTING    [phone]   Surgery Date:  Tuesday 11/16/21       Is surgery arrival time given by surgeon? YES  NO  If NO, [facility] staff will call you between 3 and 7pm the day before your surgery with your arrival time. (If your surgery is on a Monday, we will call you the Friday before.)    Call [phone] after 7pm Monday-Friday if you did not receive this call. INSTRUCTIONS BEFORE YOUR SURGERY   When You  Arrive Arrive at the [address] [facility] on the day of your surgery  Have your insurance card, photo ID, and any copayment (if needed)   Food   and   Drink NO food or drink after midnight the night before surgery    This means NO water, gum, mints, coffee, juice, etc.  No alcohol (beer, wine, liquor) 24 hours before and after surgery   Medications to   TAKE   Morning of Surgery MEDICATIONS TO TAKE THE MORNING OF SURGERY WITH A SIP OF WATER:    Take lisinopril at 5 pm day before surgery   Stop Aspirin 5 days before surgery    Bring pro air inhaler day of surgery   Medications  To  STOP      7 days before surgery  Non-Steroidal anti-inflammatory Drugs (NSAID's): for example, Ibuprofen (Advil, Motrin), Naproxen (Aleve)   Aspirin, if taking for pain    Herbal supplements, vitamins, and fish oil   Other:  (Pain medications not listed above, including Tylenol may be taken)   Blood  Thinners  If you take  Aspirin, Plavix, Coumadin, or any blood-thinning or anti-blood clot medicine, talk to the doctor who prescribed the medications for pre-operative instructions.    Bathing Clothing  Jewelry  Valuables      If you shower the morning of surgery, please do not apply anything to your skin (lotions, powders, deodorant, or makeup, especially mascara)   Follow Chlorhexidine Care Fusion body wash instructions provided to you during PAT appointment. Begin 3 days prior to surgery.  Do not shave or trim anywhere 24 hours before surgery   Wear your hair loose or down; no pony-tails, buns, or metal hair clips   Wear loose, comfortable, clean clothes   Wear glasses instead of contacts  [employer] money, valuables, and jewelry, including body piercings, at home   If you were given an [employer], bring it on day of surgery. Going Home - or Spending the Night  SAME-DAY SURGERY: You must have a responsible adult drive you home and stay with you 24 hours after surgery   ADMITS: If your doctor is keeping you in the hospital after surgery, leave personal belongings/luggage in your car until you have a hospital room number. Hospital discharge time is 12 noon  Drivers must be here before 12 noon unless you are told differently   Special Instructions It is now mandated that all surgical patients be tested for COVID-19 prior to surgery. Testing has to be exactly 4 days prior to surgery. Your COVID test date is 11/12/21between 8:00 am and 11:00 am.       COVID testing will be performed curbside at the [facility]. There will be signs leading you to the testing site. You will need to bring a photo ID with you to be swabbed. Patients are advised to self-quarantine at home after testing and prior to your surgery date. You will be notified if your results are positive.     What to watch for:   Coronavirus (COVID-19) affects different people in different ways   It also appears with a wide range of symptoms from mild to severe   Signs usually appear 2-14 days after exposure     If you develop any of the following, notify your doctor immediately:  o Fever  o Chills, with or without a shiver  o Muscle pain  o Headache  o Sore throat  o Dry cough  o New loss of taste or smell  o Tiredness      If you develop any of the following, call [phone]:  o Shortness of breath  o Difficulty breathing  o Chest pain  o New confusion  o Blueness of fingers and/or lips       Follow all instructions so your surgery wont be cancelled. Please, be on time. If a situation occurs and you are delayed the day of surgery, call [phone]     If your physical condition changes (like a fever, cold, flu, etc.) call your surgeon. Home medication(s) reviewed and verified via      LIST   VERBAL   during PAT appointment. The patient was contacted by      IN-PERSON  The patient verbalizes understanding of all instructions and      DOES NOT   need reinforcement.

## 2021-11-03 LAB
BACTERIA SPEC CULT: NORMAL
BACTERIA SPEC CULT: NORMAL
SERVICE CMNT-IMP: NORMAL

## 2021-11-03 RX ORDER — CHOLECALCIFEROL TAB 125 MCG (5000 UNIT) 125 MCG
10000 TAB ORAL DAILY
Qty: 60 TABLET | Refills: 0 | Status: SHIPPED | OUTPATIENT
Start: 2021-11-03 | End: 2021-12-03

## 2021-11-10 NOTE — PROGRESS NOTES
The patient was provided a virtual link to view the pre-operative Spine Class. A pre-operative Patient education booklet specific to spine surgery was given to the patient in PAT. The content of the class was presented using an audio power point presentation specific for patients undergoing spine surgery. Incentive spirometer and CHG bath kits were verbally reviewed. Day of surgery routine and expectations, hospital routine and expectations, nutrition, alcohol, nicotine, medications, infection control, pain management, DVT precautions and equipment, ice therapy, durable medical equipment, exercises, mobility expectations and precautions, home preparation and safety were reviewed in class. My contact information was shared with the patient to provide further information as requested by the patient related to their upcoming surgery. Patient sent email confirmation that they viewed spine class online.

## 2021-11-11 ENCOUNTER — HOSPITAL ENCOUNTER (OUTPATIENT)
Dept: PREADMISSION TESTING | Age: 71
Discharge: HOME | End: 2021-11-11
Payer: MEDICARE

## 2021-11-11 PROCEDURE — U0005 INFEC AGEN DETEC AMPLI PROBE: HCPCS

## 2021-11-12 LAB
SARS-COV-2, XPLCVT: NOT DETECTED
SOURCE, COVRS: NORMAL

## 2021-11-15 ENCOUNTER — ANESTHESIA EVENT (OUTPATIENT)
Dept: SURGERY | Age: 71
DRG: 460 | End: 2021-11-15
Payer: MEDICARE

## 2021-11-16 ENCOUNTER — ANESTHESIA (OUTPATIENT)
Dept: SURGERY | Age: 71
DRG: 460 | End: 2021-11-16
Payer: MEDICARE

## 2021-11-16 ENCOUNTER — APPOINTMENT (OUTPATIENT)
Dept: GENERAL RADIOLOGY | Age: 71
DRG: 460 | End: 2021-11-16
Attending: ORTHOPAEDIC SURGERY
Payer: MEDICARE

## 2021-11-16 ENCOUNTER — HOSPITAL ENCOUNTER (INPATIENT)
Age: 71
LOS: 3 days | Discharge: HOME HEALTH | DRG: 460 | End: 2021-11-19
Attending: ORTHOPAEDIC SURGERY | Admitting: ORTHOPAEDIC SURGERY
Payer: MEDICARE

## 2021-11-16 DIAGNOSIS — M48.061 SPINAL STENOSIS OF LUMBAR REGION WITHOUT NEUROGENIC CLAUDICATION: Primary | ICD-10-CM

## 2021-11-16 PROCEDURE — 77030005513 HC CATH URETH FOL11 MDII -B: Performed by: ORTHOPAEDIC SURGERY

## 2021-11-16 PROCEDURE — C1713 ANCHOR/SCREW BN/BN,TIS/BN: HCPCS | Performed by: ORTHOPAEDIC SURGERY

## 2021-11-16 PROCEDURE — 77030020061 HC IV BLD WRMR ADMIN SET 3M -B: Performed by: ANESTHESIOLOGY

## 2021-11-16 PROCEDURE — 77030028271 HC SRGFL HEMSTAT MTRX KT J&J -C: Performed by: ORTHOPAEDIC SURGERY

## 2021-11-16 PROCEDURE — 74011250636 HC RX REV CODE- 250/636: Performed by: NURSE ANESTHETIST, CERTIFIED REGISTERED

## 2021-11-16 PROCEDURE — 74011250636 HC RX REV CODE- 250/636: Performed by: ANESTHESIOLOGY

## 2021-11-16 PROCEDURE — 74011250637 HC RX REV CODE- 250/637: Performed by: NURSE PRACTITIONER

## 2021-11-16 PROCEDURE — 65270000029 HC RM PRIVATE

## 2021-11-16 PROCEDURE — 74011250636 HC RX REV CODE- 250/636: Performed by: ORTHOPAEDIC SURGERY

## 2021-11-16 PROCEDURE — 74011000258 HC RX REV CODE- 258: Performed by: NURSE ANESTHETIST, CERTIFIED REGISTERED

## 2021-11-16 PROCEDURE — 74011250637 HC RX REV CODE- 250/637: Performed by: ORTHOPAEDIC SURGERY

## 2021-11-16 PROCEDURE — 77030038552 HC DRN WND MDII -A: Performed by: ORTHOPAEDIC SURGERY

## 2021-11-16 PROCEDURE — 77030040179 HC DEV DRSG WND PICO S&N -C: Performed by: ORTHOPAEDIC SURGERY

## 2021-11-16 PROCEDURE — 77030004391 HC BUR FLUT MEDT -C: Performed by: ORTHOPAEDIC SURGERY

## 2021-11-16 PROCEDURE — 74011000250 HC RX REV CODE- 250: Performed by: NURSE ANESTHETIST, CERTIFIED REGISTERED

## 2021-11-16 PROCEDURE — C1762 CONN TISS, HUMAN(INC FASCIA): HCPCS | Performed by: ORTHOPAEDIC SURGERY

## 2021-11-16 PROCEDURE — 77030039147 HC PWDR HEMSTS SURGICEL JNJ -D: Performed by: ORTHOPAEDIC SURGERY

## 2021-11-16 PROCEDURE — 74011000250 HC RX REV CODE- 250: Performed by: ORTHOPAEDIC SURGERY

## 2021-11-16 PROCEDURE — 77030013079 HC BLNKT BAIR HGGR 3M -A: Performed by: ANESTHESIOLOGY

## 2021-11-16 PROCEDURE — C9290 INJ, BUPIVACAINE LIPOSOME: HCPCS | Performed by: ORTHOPAEDIC SURGERY

## 2021-11-16 PROCEDURE — 77030035236 HC SUT PDS STRATFX BARB J&J -B: Performed by: ORTHOPAEDIC SURGERY

## 2021-11-16 PROCEDURE — 07DR3ZZ EXTRACTION OF ILIAC BONE MARROW, PERCUTANEOUS APPROACH: ICD-10-PCS | Performed by: ORTHOPAEDIC SURGERY

## 2021-11-16 PROCEDURE — 77030035129: Performed by: ORTHOPAEDIC SURGERY

## 2021-11-16 PROCEDURE — 77030002982 HC SUT POLYSRB J&J -A: Performed by: ORTHOPAEDIC SURGERY

## 2021-11-16 PROCEDURE — 01NB0ZZ RELEASE LUMBAR NERVE, OPEN APPROACH: ICD-10-PCS | Performed by: ORTHOPAEDIC SURGERY

## 2021-11-16 PROCEDURE — 76210000000 HC OR PH I REC 2 TO 2.5 HR: Performed by: ORTHOPAEDIC SURGERY

## 2021-11-16 PROCEDURE — 77030026438 HC STYL ET INTUB CARD -A: Performed by: ANESTHESIOLOGY

## 2021-11-16 PROCEDURE — 0SG1071 FUSION OF 2 OR MORE LUMBAR VERTEBRAL JOINTS WITH AUTOLOGOUS TISSUE SUBSTITUTE, POSTERIOR APPROACH, POSTERIOR COLUMN, OPEN APPROACH: ICD-10-PCS | Performed by: ORTHOPAEDIC SURGERY

## 2021-11-16 PROCEDURE — 74011250636 HC RX REV CODE- 250/636: Performed by: NURSE PRACTITIONER

## 2021-11-16 PROCEDURE — 77030008684 HC TU ET CUF COVD -B: Performed by: ANESTHESIOLOGY

## 2021-11-16 PROCEDURE — 77030010507 HC ADH SKN DERMBND J&J -B: Performed by: ORTHOPAEDIC SURGERY

## 2021-11-16 PROCEDURE — 74011000250 HC RX REV CODE- 250: Performed by: NURSE PRACTITIONER

## 2021-11-16 PROCEDURE — 76010000179 HC OR TIME 6 TO 6.5 HR INTENSV-TIER 1: Performed by: ORTHOPAEDIC SURGERY

## 2021-11-16 PROCEDURE — 2709999900 HC NON-CHARGEABLE SUPPLY: Performed by: ORTHOPAEDIC SURGERY

## 2021-11-16 PROCEDURE — 76060000043 HC ANESTHESIA 6 TO 6.5 HR: Performed by: ORTHOPAEDIC SURGERY

## 2021-11-16 PROCEDURE — 77030038692 HC WND DEB SYS IRMX -B: Performed by: ORTHOPAEDIC SURGERY

## 2021-11-16 DEVICE — SCR SET SPNE STREAMLINE TL --: Type: IMPLANTABLE DEVICE | Site: SPINE LUMBAR | Status: FUNCTIONAL

## 2021-11-16 DEVICE — GRAFT BONE 10 CC: Type: IMPLANTABLE DEVICE | Site: SPINE LUMBAR | Status: FUNCTIONAL

## 2021-11-16 DEVICE — GRAFT BONE 5 CC: Type: IMPLANTABLE DEVICE | Site: SPINE LUMBAR | Status: FUNCTIONAL

## 2021-11-16 DEVICE — SCR BNE SPNE 7.5X45MM TI -- STREAMLINE TL: Type: IMPLANTABLE DEVICE | Site: SPINE LUMBAR | Status: FUNCTIONAL

## 2021-11-16 RX ORDER — SODIUM CHLORIDE 9 MG/ML
INJECTION, SOLUTION INTRAVENOUS
Status: DISCONTINUED | OUTPATIENT
Start: 2021-11-16 | End: 2021-11-16 | Stop reason: HOSPADM

## 2021-11-16 RX ORDER — NEOSTIGMINE METHYLSULFATE 1 MG/ML
INJECTION, SOLUTION INTRAVENOUS AS NEEDED
Status: DISCONTINUED | OUTPATIENT
Start: 2021-11-16 | End: 2021-11-16 | Stop reason: HOSPADM

## 2021-11-16 RX ORDER — FACIAL-BODY WIPES
10 EACH TOPICAL DAILY PRN
Status: DISCONTINUED | OUTPATIENT
Start: 2021-11-18 | End: 2021-11-19 | Stop reason: HOSPADM

## 2021-11-16 RX ORDER — MONTELUKAST SODIUM 10 MG/1
10 TABLET ORAL DAILY
Status: DISCONTINUED | OUTPATIENT
Start: 2021-11-17 | End: 2021-11-19 | Stop reason: HOSPADM

## 2021-11-16 RX ORDER — HYDROMORPHONE HYDROCHLORIDE 2 MG/ML
INJECTION, SOLUTION INTRAMUSCULAR; INTRAVENOUS; SUBCUTANEOUS AS NEEDED
Status: DISCONTINUED | OUTPATIENT
Start: 2021-11-16 | End: 2021-11-16 | Stop reason: HOSPADM

## 2021-11-16 RX ORDER — PRAVASTATIN SODIUM 10 MG/1
10 TABLET ORAL
Status: DISCONTINUED | OUTPATIENT
Start: 2021-11-16 | End: 2021-11-19 | Stop reason: HOSPADM

## 2021-11-16 RX ORDER — PROPOFOL 10 MG/ML
INJECTION, EMULSION INTRAVENOUS AS NEEDED
Status: DISCONTINUED | OUTPATIENT
Start: 2021-11-16 | End: 2021-11-16 | Stop reason: HOSPADM

## 2021-11-16 RX ORDER — LIDOCAINE HYDROCHLORIDE 20 MG/ML
INJECTION, SOLUTION EPIDURAL; INFILTRATION; INTRACAUDAL; PERINEURAL AS NEEDED
Status: DISCONTINUED | OUTPATIENT
Start: 2021-11-16 | End: 2021-11-16 | Stop reason: HOSPADM

## 2021-11-16 RX ORDER — NALOXONE HYDROCHLORIDE 0.4 MG/ML
0.2 INJECTION, SOLUTION INTRAMUSCULAR; INTRAVENOUS; SUBCUTANEOUS
Status: DISCONTINUED | OUTPATIENT
Start: 2021-11-16 | End: 2021-11-16 | Stop reason: HOSPADM

## 2021-11-16 RX ORDER — KETOROLAC TROMETHAMINE 30 MG/ML
15 INJECTION, SOLUTION INTRAMUSCULAR; INTRAVENOUS EVERY 6 HOURS
Status: COMPLETED | OUTPATIENT
Start: 2021-11-16 | End: 2021-11-17

## 2021-11-16 RX ORDER — SODIUM CHLORIDE, SODIUM LACTATE, POTASSIUM CHLORIDE, CALCIUM CHLORIDE 600; 310; 30; 20 MG/100ML; MG/100ML; MG/100ML; MG/100ML
125 INJECTION, SOLUTION INTRAVENOUS CONTINUOUS
Status: DISCONTINUED | OUTPATIENT
Start: 2021-11-16 | End: 2021-11-16 | Stop reason: HOSPADM

## 2021-11-16 RX ORDER — FAMOTIDINE 20 MG/1
20 TABLET, FILM COATED ORAL 2 TIMES DAILY
Status: DISCONTINUED | OUTPATIENT
Start: 2021-11-16 | End: 2021-11-19 | Stop reason: HOSPADM

## 2021-11-16 RX ORDER — CELECOXIB 100 MG/1
200 CAPSULE ORAL
Status: COMPLETED | OUTPATIENT
Start: 2021-11-16 | End: 2021-11-16

## 2021-11-16 RX ORDER — KETAMINE HYDROCHLORIDE 10 MG/ML
INJECTION, SOLUTION INTRAMUSCULAR; INTRAVENOUS AS NEEDED
Status: DISCONTINUED | OUTPATIENT
Start: 2021-11-16 | End: 2021-11-16 | Stop reason: HOSPADM

## 2021-11-16 RX ORDER — FENTANYL CITRATE 50 UG/ML
INJECTION, SOLUTION INTRAMUSCULAR; INTRAVENOUS AS NEEDED
Status: DISCONTINUED | OUTPATIENT
Start: 2021-11-16 | End: 2021-11-16 | Stop reason: HOSPADM

## 2021-11-16 RX ORDER — ONDANSETRON 2 MG/ML
4 INJECTION INTRAMUSCULAR; INTRAVENOUS
Status: ACTIVE | OUTPATIENT
Start: 2021-11-16 | End: 2021-11-17

## 2021-11-16 RX ORDER — SODIUM CHLORIDE 9 MG/ML
125 INJECTION, SOLUTION INTRAVENOUS CONTINUOUS
Status: DISPENSED | OUTPATIENT
Start: 2021-11-16 | End: 2021-11-17

## 2021-11-16 RX ORDER — LIDOCAINE HYDROCHLORIDE 10 MG/ML
0.1 INJECTION, SOLUTION EPIDURAL; INFILTRATION; INTRACAUDAL; PERINEURAL AS NEEDED
Status: DISCONTINUED | OUTPATIENT
Start: 2021-11-16 | End: 2021-11-16 | Stop reason: HOSPADM

## 2021-11-16 RX ORDER — GABAPENTIN 300 MG/1
300 CAPSULE ORAL
Status: COMPLETED | OUTPATIENT
Start: 2021-11-16 | End: 2021-11-16

## 2021-11-16 RX ORDER — OXYCODONE HYDROCHLORIDE 5 MG/1
5 TABLET ORAL
Status: DISCONTINUED | OUTPATIENT
Start: 2021-11-16 | End: 2021-11-19 | Stop reason: HOSPADM

## 2021-11-16 RX ORDER — TRAMADOL HYDROCHLORIDE 50 MG/1
50 TABLET ORAL
Status: DISCONTINUED | OUTPATIENT
Start: 2021-11-16 | End: 2021-11-19 | Stop reason: HOSPADM

## 2021-11-16 RX ORDER — GLYCOPYRROLATE 0.2 MG/ML
INJECTION INTRAMUSCULAR; INTRAVENOUS AS NEEDED
Status: DISCONTINUED | OUTPATIENT
Start: 2021-11-16 | End: 2021-11-16 | Stop reason: HOSPADM

## 2021-11-16 RX ORDER — ADHESIVE BANDAGE
15 BANDAGE TOPICAL 2 TIMES DAILY
Status: DISCONTINUED | OUTPATIENT
Start: 2021-11-16 | End: 2021-11-18

## 2021-11-16 RX ORDER — PROPOFOL 10 MG/ML
INJECTION, EMULSION INTRAVENOUS
Status: DISCONTINUED | OUTPATIENT
Start: 2021-11-16 | End: 2021-11-16 | Stop reason: HOSPADM

## 2021-11-16 RX ORDER — ONDANSETRON 2 MG/ML
INJECTION INTRAMUSCULAR; INTRAVENOUS AS NEEDED
Status: DISCONTINUED | OUTPATIENT
Start: 2021-11-16 | End: 2021-11-16 | Stop reason: HOSPADM

## 2021-11-16 RX ORDER — ACETAMINOPHEN 500 MG
1000 TABLET ORAL
Status: COMPLETED | OUTPATIENT
Start: 2021-11-16 | End: 2021-11-16

## 2021-11-16 RX ORDER — PHENYLEPHRINE HCL IN 0.9% NACL 0.4MG/10ML
SYRINGE (ML) INTRAVENOUS AS NEEDED
Status: DISCONTINUED | OUTPATIENT
Start: 2021-11-16 | End: 2021-11-16 | Stop reason: HOSPADM

## 2021-11-16 RX ORDER — OXYCODONE HYDROCHLORIDE 5 MG/1
10 TABLET ORAL
Status: DISCONTINUED | OUTPATIENT
Start: 2021-11-16 | End: 2021-11-19 | Stop reason: HOSPADM

## 2021-11-16 RX ORDER — TRANEXAMIC ACID 100 MG/ML
INJECTION, SOLUTION INTRAVENOUS AS NEEDED
Status: DISCONTINUED | OUTPATIENT
Start: 2021-11-16 | End: 2021-11-16 | Stop reason: HOSPADM

## 2021-11-16 RX ORDER — CYCLOBENZAPRINE HCL 10 MG
10 TABLET ORAL
Status: DISCONTINUED | OUTPATIENT
Start: 2021-11-16 | End: 2021-11-19 | Stop reason: HOSPADM

## 2021-11-16 RX ORDER — HYDROMORPHONE HYDROCHLORIDE 1 MG/ML
.25-1 INJECTION, SOLUTION INTRAMUSCULAR; INTRAVENOUS; SUBCUTANEOUS
Status: DISCONTINUED | OUTPATIENT
Start: 2021-11-16 | End: 2021-11-16 | Stop reason: HOSPADM

## 2021-11-16 RX ORDER — FLUMAZENIL 0.1 MG/ML
0.2 INJECTION INTRAVENOUS
Status: DISCONTINUED | OUTPATIENT
Start: 2021-11-16 | End: 2021-11-16 | Stop reason: HOSPADM

## 2021-11-16 RX ORDER — DEXAMETHASONE SODIUM PHOSPHATE 4 MG/ML
INJECTION, SOLUTION INTRA-ARTICULAR; INTRALESIONAL; INTRAMUSCULAR; INTRAVENOUS; SOFT TISSUE AS NEEDED
Status: DISCONTINUED | OUTPATIENT
Start: 2021-11-16 | End: 2021-11-16 | Stop reason: HOSPADM

## 2021-11-16 RX ORDER — SUCCINYLCHOLINE CHLORIDE 20 MG/ML
INJECTION INTRAMUSCULAR; INTRAVENOUS AS NEEDED
Status: DISCONTINUED | OUTPATIENT
Start: 2021-11-16 | End: 2021-11-16 | Stop reason: HOSPADM

## 2021-11-16 RX ORDER — SPIRONOLACTONE 25 MG/1
25 TABLET ORAL DAILY
Status: DISCONTINUED | OUTPATIENT
Start: 2021-11-17 | End: 2021-11-19 | Stop reason: HOSPADM

## 2021-11-16 RX ORDER — MORPHINE SULFATE 2 MG/ML
2 INJECTION, SOLUTION INTRAMUSCULAR; INTRAVENOUS
Status: ACTIVE | OUTPATIENT
Start: 2021-11-16 | End: 2021-11-17

## 2021-11-16 RX ORDER — ROCURONIUM BROMIDE 10 MG/ML
INJECTION, SOLUTION INTRAVENOUS AS NEEDED
Status: DISCONTINUED | OUTPATIENT
Start: 2021-11-16 | End: 2021-11-16 | Stop reason: HOSPADM

## 2021-11-16 RX ORDER — SODIUM CHLORIDE 0.9 % (FLUSH) 0.9 %
5-40 SYRINGE (ML) INJECTION EVERY 8 HOURS
Status: DISCONTINUED | OUTPATIENT
Start: 2021-11-16 | End: 2021-11-19 | Stop reason: HOSPADM

## 2021-11-16 RX ORDER — SODIUM CHLORIDE 0.9 % (FLUSH) 0.9 %
5-40 SYRINGE (ML) INJECTION AS NEEDED
Status: DISCONTINUED | OUTPATIENT
Start: 2021-11-16 | End: 2021-11-19 | Stop reason: HOSPADM

## 2021-11-16 RX ORDER — MIDAZOLAM HYDROCHLORIDE 1 MG/ML
INJECTION, SOLUTION INTRAMUSCULAR; INTRAVENOUS AS NEEDED
Status: DISCONTINUED | OUTPATIENT
Start: 2021-11-16 | End: 2021-11-16 | Stop reason: HOSPADM

## 2021-11-16 RX ORDER — LISINOPRIL 5 MG/1
10 TABLET ORAL
Status: DISCONTINUED | OUTPATIENT
Start: 2021-11-16 | End: 2021-11-19 | Stop reason: HOSPADM

## 2021-11-16 RX ORDER — VANCOMYCIN HYDROCHLORIDE 1 G/20ML
INJECTION, POWDER, LYOPHILIZED, FOR SOLUTION INTRAVENOUS AS NEEDED
Status: DISCONTINUED | OUTPATIENT
Start: 2021-11-16 | End: 2021-11-16 | Stop reason: HOSPADM

## 2021-11-16 RX ORDER — POLYETHYLENE GLYCOL 3350 17 G/17G
17 POWDER, FOR SOLUTION ORAL DAILY
Status: DISCONTINUED | OUTPATIENT
Start: 2021-11-17 | End: 2021-11-19 | Stop reason: HOSPADM

## 2021-11-16 RX ORDER — ALBUTEROL SULFATE 0.83 MG/ML
2.5 SOLUTION RESPIRATORY (INHALATION)
Status: DISCONTINUED | OUTPATIENT
Start: 2021-11-16 | End: 2021-11-19 | Stop reason: HOSPADM

## 2021-11-16 RX ORDER — DIPHENHYDRAMINE HYDROCHLORIDE 50 MG/ML
12.5 INJECTION, SOLUTION INTRAMUSCULAR; INTRAVENOUS AS NEEDED
Status: DISCONTINUED | OUTPATIENT
Start: 2021-11-16 | End: 2021-11-16 | Stop reason: HOSPADM

## 2021-11-16 RX ORDER — AMOXICILLIN 250 MG
1 CAPSULE ORAL 2 TIMES DAILY
Status: DISCONTINUED | OUTPATIENT
Start: 2021-11-16 | End: 2021-11-19 | Stop reason: HOSPADM

## 2021-11-16 RX ORDER — FAMOTIDINE 10 MG/ML
INJECTION INTRAVENOUS AS NEEDED
Status: DISCONTINUED | OUTPATIENT
Start: 2021-11-16 | End: 2021-11-16 | Stop reason: HOSPADM

## 2021-11-16 RX ORDER — ACETAMINOPHEN 500 MG
1000 TABLET ORAL EVERY 6 HOURS
Status: DISCONTINUED | OUTPATIENT
Start: 2021-11-16 | End: 2021-11-19 | Stop reason: HOSPADM

## 2021-11-16 RX ORDER — NALOXONE HYDROCHLORIDE 0.4 MG/ML
0.4 INJECTION, SOLUTION INTRAMUSCULAR; INTRAVENOUS; SUBCUTANEOUS AS NEEDED
Status: DISCONTINUED | OUTPATIENT
Start: 2021-11-16 | End: 2021-11-19 | Stop reason: HOSPADM

## 2021-11-16 RX ADMIN — DEXAMETHASONE SODIUM PHOSPHATE 8 MG: 4 INJECTION, SOLUTION INTRAMUSCULAR; INTRAVENOUS at 08:51

## 2021-11-16 RX ADMIN — Medication 80 MCG: at 08:57

## 2021-11-16 RX ADMIN — PROPOFOL 200 MG: 10 INJECTION, EMULSION INTRAVENOUS at 08:14

## 2021-11-16 RX ADMIN — PROPOFOL 100 MCG/KG/MIN: 10 INJECTION, EMULSION INTRAVENOUS at 08:45

## 2021-11-16 RX ADMIN — ACETAMINOPHEN 1000 MG: 500 TABLET ORAL at 08:02

## 2021-11-16 RX ADMIN — PHENYLEPHRINE HYDROCHLORIDE 40 MCG/MIN: 10 INJECTION INTRAVENOUS at 10:03

## 2021-11-16 RX ADMIN — ROCURONIUM BROMIDE 20 MG: 10 INJECTION INTRAVENOUS at 09:33

## 2021-11-16 RX ADMIN — Medication 80 MCG: at 09:00

## 2021-11-16 RX ADMIN — PRAVASTATIN SODIUM 10 MG: 10 TABLET ORAL at 21:25

## 2021-11-16 RX ADMIN — CEFAZOLIN SODIUM 2 G: 1 POWDER, FOR SOLUTION INTRAMUSCULAR; INTRAVENOUS at 12:25

## 2021-11-16 RX ADMIN — OXYCODONE 10 MG: 5 TABLET ORAL at 21:25

## 2021-11-16 RX ADMIN — ROCURONIUM BROMIDE 10 MG: 10 INJECTION INTRAVENOUS at 08:14

## 2021-11-16 RX ADMIN — KETOROLAC TROMETHAMINE 15 MG: 30 INJECTION, SOLUTION INTRAMUSCULAR at 17:32

## 2021-11-16 RX ADMIN — FAMOTIDINE 20 MG: 20 TABLET, FILM COATED ORAL at 18:29

## 2021-11-16 RX ADMIN — Medication 80 MCG: at 08:50

## 2021-11-16 RX ADMIN — MIDAZOLAM 2 MG: 1 INJECTION, SOLUTION INTRAMUSCULAR; INTRAVENOUS at 08:03

## 2021-11-16 RX ADMIN — TRANEXAMIC ACID 1 G: 100 INJECTION, SOLUTION INTRAVENOUS at 09:05

## 2021-11-16 RX ADMIN — LIDOCAINE HYDROCHLORIDE 100 MG: 20 INJECTION, SOLUTION EPIDURAL; INFILTRATION; INTRACAUDAL; PERINEURAL at 08:14

## 2021-11-16 RX ADMIN — KETAMINE HYDROCHLORIDE 20 MG: 10 INJECTION INTRAMUSCULAR; INTRAVENOUS at 08:09

## 2021-11-16 RX ADMIN — HYDROMORPHONE HYDROCHLORIDE 0.5 MG: 1 INJECTION, SOLUTION INTRAMUSCULAR; INTRAVENOUS; SUBCUTANEOUS at 15:47

## 2021-11-16 RX ADMIN — ONDANSETRON HYDROCHLORIDE 4 MG: 2 SOLUTION INTRAMUSCULAR; INTRAVENOUS at 13:30

## 2021-11-16 RX ADMIN — PROPOFOL 100 MCG/KG/MIN: 10 INJECTION, EMULSION INTRAVENOUS at 09:43

## 2021-11-16 RX ADMIN — SUGAMMADEX 200 MG: 100 INJECTION, SOLUTION INTRAVENOUS at 14:10

## 2021-11-16 RX ADMIN — HYDROMORPHONE HYDROCHLORIDE 0.5 MG: 2 INJECTION INTRAMUSCULAR; INTRAVENOUS; SUBCUTANEOUS at 08:51

## 2021-11-16 RX ADMIN — Medication 10 ML: at 21:22

## 2021-11-16 RX ADMIN — ROCURONIUM BROMIDE 30 MG: 10 INJECTION INTRAVENOUS at 08:51

## 2021-11-16 RX ADMIN — CEFAZOLIN 2 G: 1 INJECTION, POWDER, FOR SOLUTION INTRAMUSCULAR; INTRAVENOUS at 21:16

## 2021-11-16 RX ADMIN — GLYCOPYRROLATE 0.4 MG: 0.2 INJECTION INTRAMUSCULAR; INTRAVENOUS at 13:41

## 2021-11-16 RX ADMIN — SUCCINYLCHOLINE CHLORIDE 140 MG: 20 INJECTION, SOLUTION INTRAMUSCULAR; INTRAVENOUS at 08:15

## 2021-11-16 RX ADMIN — Medication 80 MCG: at 09:21

## 2021-11-16 RX ADMIN — KETAMINE HYDROCHLORIDE 10 MG: 10 INJECTION INTRAMUSCULAR; INTRAVENOUS at 08:30

## 2021-11-16 RX ADMIN — SODIUM CHLORIDE: 9 INJECTION, SOLUTION INTRAVENOUS at 12:57

## 2021-11-16 RX ADMIN — SODIUM CHLORIDE, POTASSIUM CHLORIDE, SODIUM LACTATE AND CALCIUM CHLORIDE: 600; 310; 30; 20 INJECTION, SOLUTION INTRAVENOUS at 08:40

## 2021-11-16 RX ADMIN — SODIUM CHLORIDE, POTASSIUM CHLORIDE, SODIUM LACTATE AND CALCIUM CHLORIDE 125 ML/HR: 600; 310; 30; 20 INJECTION, SOLUTION INTRAVENOUS at 08:00

## 2021-11-16 RX ADMIN — MAGNESIUM HYDROXIDE 15 ML: 400 SUSPENSION ORAL at 18:30

## 2021-11-16 RX ADMIN — SODIUM CHLORIDE 125 ML/HR: 9 INJECTION, SOLUTION INTRAVENOUS at 17:13

## 2021-11-16 RX ADMIN — SODIUM CHLORIDE, POTASSIUM CHLORIDE, SODIUM LACTATE AND CALCIUM CHLORIDE: 600; 310; 30; 20 INJECTION, SOLUTION INTRAVENOUS at 11:45

## 2021-11-16 RX ADMIN — Medication 3 MG: at 13:41

## 2021-11-16 RX ADMIN — CEFAZOLIN SODIUM 2 G: 1 POWDER, FOR SOLUTION INTRAMUSCULAR; INTRAVENOUS at 08:45

## 2021-11-16 RX ADMIN — SODIUM CHLORIDE 10 MCG/KG/MIN: 9 INJECTION, SOLUTION INTRAVENOUS at 08:45

## 2021-11-16 RX ADMIN — GABAPENTIN 300 MG: 300 CAPSULE ORAL at 08:01

## 2021-11-16 RX ADMIN — FAMOTIDINE 20 MG: 10 INJECTION INTRAVENOUS at 08:03

## 2021-11-16 RX ADMIN — FENTANYL CITRATE 100 MCG: 50 INJECTION, SOLUTION INTRAMUSCULAR; INTRAVENOUS at 08:10

## 2021-11-16 RX ADMIN — Medication 80 MCG: at 09:50

## 2021-11-16 RX ADMIN — CELECOXIB 200 MG: 100 CAPSULE ORAL at 08:01

## 2021-11-16 RX ADMIN — ROCURONIUM BROMIDE 10 MG: 10 INJECTION INTRAVENOUS at 12:21

## 2021-11-16 RX ADMIN — TRANEXAMIC ACID 1 G: 100 INJECTION, SOLUTION INTRAVENOUS at 13:27

## 2021-11-16 RX ADMIN — DOCUSATE SODIUM 50MG AND SENNOSIDES 8.6MG 1 TABLET: 8.6; 5 TABLET, FILM COATED ORAL at 18:30

## 2021-11-16 RX ADMIN — LISINOPRIL 10 MG: 5 TABLET ORAL at 21:18

## 2021-11-16 RX ADMIN — Medication 80 MCG: at 09:54

## 2021-11-16 RX ADMIN — ROCURONIUM BROMIDE 20 MG: 10 INJECTION INTRAVENOUS at 11:51

## 2021-11-16 RX ADMIN — OXYCODONE 10 MG: 5 TABLET ORAL at 18:30

## 2021-11-16 RX ADMIN — ACETAMINOPHEN 1000 MG: 500 TABLET ORAL at 18:30

## 2021-11-16 NOTE — OP NOTES
DATE OF SERVICE: 11/16/2021    Location: [address] [place] [address]     SURGEON:  Diane Guaman MD     PREOPERATIVE DIAGNOSES:  1. Lumbar stenosis L2-5.  2. Lumbar spondylolisthesis. 3. Lumbar radiculopathy. POSTOPERATIVE DIAGNOSES:  1. Lumbar stenosis L2-5.  2. Lumbar spondylolisthesis. 3. Lumbar radiculopathy. PROCEDURES:  1. Posterolateral lumbar fusion, L2-5.  2. Placement of bilateral pedicle screw instrumentation utilizing Surgalign system with 7.5 x 45 mm screws bilaterally at L2, L3, L4, L5,   and 2 lordotic rods. 3. Use of O-arm stereotactic navigation for pedicle screw placement. 4. Posterolateral fusion with iliac crest bone marrow aspirate from the iliac  crest as well as morselized allograft for posterolateral fusion of L2-5.  5. Lumbar laminectomy with medial facetectomies and foraminotomies L2, L3, L4, L5  6. Use of morsellized allograft Vibone, osteoamp and local autograft bone for spinal fusion     COMPLICATIONS:  None. ESTIMATED BLOOD LOSS:  300 mL. Neuromonitoring: SSEP and EMG utilized     Surgeon: Diane Guaman MD  Assist: Mike Soler NP was present and scrubbed for procedure today as my surgical assistant and provided assistance with exposure, retraction and wound closure. INDICATIONS FOR PROCEDURE:  The patient is a very pleasant 70 y. o.female who presents  with lumbar stenosis, spondylolisthesis and intractable back and leg pain. The patient had leg weakness, she had 1-2/5 left quad, 2-3/5 DF on left. EMG demonstrated lumbar L2-3-4 radiculopathy   Plain x-rays demonstrated spondylolisthesis and sagittal plan imbalance and MRI demonstrated severe stenosis L2-3 and L3-4, spondylolisthesis and moderate lateral recess stenosis L4-5. The patient failed all  conservative measures including therapeutic exercise, medications and injections. Patient  presents today for posterior decompression L2-5 and instrumentation and fusion L2-5 . We once again reviewed the risks and benefits and  wished to proceed. Lumbar spine was marked as the site of surgery. Patient provided informed consent. We discussed risks and benefits including bleeding, infection, spinal fluid leaks, neurological injury, vascular or visceral injury, hernia, pseudoarthrosis, hardware failure, need for additional surgery as well as adjacent segment disease above or below fusion as well as other medical and anesthetic related complications including but not limited to DVT, PE, respiratory failure and stroke, death. After understanding risks/benefits, patient wished to proceed. OPERATIVE NOTE:   H&P and consent form updated. The patient's lumbar spine was marked as the site of surgery. Once again, we reviewed risks/benefits and realistic expectations and patient wished to proceed. Immediately preop patient demonstrated severe left leg weakness with 1-2 left quad, 2-3/5 DF on left, diminished sensation left leg. The patient was taken to the operating room. Once in the operating room, patient underwent a general anesthetic by the anesthesia team. Meyers catheter was inserted under sterile conditions. SCDs were maintained. SSEP and free running EMG, neurologic monitoring electrodes were applied. The patient was then gently placed prone on the [facility] Nose frame. All bony prominences were well padded including cubital and carpal tunnels, iliac crest, knees, and ankles. Slight reverse trendelenburg position applied to decrease ocular pressure. No pressure on the eyes per anesthesia. The patient was then sterilely prepped and draped in usual sterile fashion including alcohol followed by Betadine scrub and ChloraPrep. A  time-out was performed to confirm the levels of surgery and surgical procedure and confirmed by Anesthesia Staff, OR staff and myself. The patient received preop antibiotic Ancef. I then proceeded with utilizing midline lumbar skin incision.  Careful sharp dissection taken down subcutaneous tissue spinous processes of L1-S1 were identified and subperiosteally exposed. Then utilized fluoroscopy to verify levels. I then proceeded to expose the facet joints and transverse processes of L2-5. Care was taken to not violate the facet joint capsule of L1-2. At this point I noted marked spondylosis and facet joint arthropathy. I then proceeded to remove the facet joint capsules and facet osteophytes and proceeded to decorticate inside the facet joints. And then proceeded to palpate the posterior superior iliac spine on the right. I exposed the PSIS through separate fascial incision, I then utilized Jamshidi needle and obtained bone marrow aspirate from the ileum. I then proceeded with floseal for hemostasis and fascia was closed separately. I then proceeded with placement of the spinous process clamp for the Medtronic O arm and obtained my 1st O arm spin. After confirming excellent images I then proceeded to utilize navigated instruments for  hole preparation. I utilized the navigated bur followed by navigated gearshift followed by a navigated tap. I then proceeded to manually probe all  holes and there was no evidence of breech and intact inferior endpoints. I then proceeded with placement of navigated pedicle screws from L2-5. All screws had good insertional torque. I then proceeded with stimulating EMG, all had safe responses. And then proceeded with the decompression portion of procedure. I proceeded removal of the spinous processes of L3 with rongeur and then proceeded to thin the lamina with rongeur. I then proceeded to utilize angled Kerrisons, proceeded with central laminectomy utilizing [facility] to protect the dura underneath. After central laminectomy I then proceeded with bilateral medial facetectomy at L3-4 to decompress the traversing L4 roots bilaterally.  There was severe central stenosis at this level compressing the thecal sac centrally underneath the lamina, then lateral recesses were decompressed out to the pedicles for the traversing L4 roots bilaterally with lateral stenosis and now roots were free of compression. I then proceeded with bilateral foraminotomies of L3-4 with 2 and 3 mm Kerrisons to decompress stenosis within the foraminal zone of the exiting L3 roots. After decompression of this level, no further central stenosis and ghulam and lozano ball could easily be passed out the foramen bilaterally. I then performed an identical procedure at L2.  proceeded removal of the spinous processes of L2 with rongeur and then proceeded to thin the lamina with rongeur. I then proceeded to utilize angled Kerrisons, proceeded with central laminectomy utilizing [facility] to protect the dura underneath. After central laminectomy I then proceeded with bilateral medial facetectomy at L2-3 to decompress the traversing L3 roots bilaterally. There was significant central stenosis at this level compressing the thecal sac centrally underneath the lamina, then lateral recesses were decompressed out to the pedicles for the traversing L3 roots bilaterally with severe lateral recess stenosis and now roots were free of compression. I then proceeded with bilateral foraminotomies of L2-3 with 2 and 3 mm Kerrisons to decompress stenosis within the foraminal zone of the exiting L2 roots. After decompression of this level, no further central stenosis and ghulam and lozano ball could easily be passed out the foramen bilaterally. I then proceeded with bilateral medial facetectomy at L4-5 utilizing the bur to thin the laminar of L4 and L5 bilaterally, I spared the interspinous ligament. I then proceeded with medial facetectomy of L4-5 with angle kerrison rongeurs to decompress lateral recess stenosis bilaterally, the L5 root was fully decompressed.      After decompression, there was no further stenosis or neurologic impingement at these levels, there is no evidence of spinal fluid leak. I utilized FloSeal as well as patties for hemostasis. I then proceeded to decorticate the transverse processes and lateral aspect of the facet joints from L2-5. I proceeded with copious irrigation. I then proceeded to pack morselized allograft combined with local autograft bone and iliac crest bone marrow directly to the decorticated surfaces. Bone graft had been mixed with vancomycin powder. I then proceeded to place rods and end caps to the pedicle screws. All screws were torqued and counter torqued to within [employer] specifications. Final radiographic studies shows safe position of all hardware improved of compared to preop. And then proceeded with placement of a Hemovac drain deep to the fascia, fascia was then reapproximated with 1.  Strata fix suture, and then proceeded with closure of the subcutaneous tissues followed by subcuticular closure and placement of a sterile dressing. All neurological monitoring remained unchanged from baseline. Patient tolerated procedure well was taken to the recovery room stable condition. Surgery discussed with family and questions answered. Praveen Jara NP was present and scrubbed for the entire procedure and assisted with retractors, suction, exposure, graft preparation, instrumentation preparation and wound closure.      Dictated by Sindy Potts MD

## 2021-11-16 NOTE — ANESTHESIA POSTPROCEDURE EVALUATION
Procedure(s):  L2-5 LAMINECTOMY AND DECOMPRESSION, L2-5 POSTERIOR FUSION WITH IMAGE GUIDANCE, ALLOGRAFT (O-ARM). general    Anesthesia Post Evaluation      Multimodal analgesia: multimodal analgesia not used between 6 hours prior to anesthesia start to PACU discharge  Patient location during evaluation: PACU  Patient participation: complete - patient participated  Level of consciousness: awake  Pain management: adequate  Airway patency: patent  Anesthetic complications: no  Cardiovascular status: acceptable, blood pressure returned to baseline and hemodynamically stable  Respiratory status: acceptable  Hydration status: acceptable  Post anesthesia nausea and vomiting:  controlled      INITIAL Post-op Vital signs:   Vitals Value Taken Time   /72 11/16/21 1630   Temp 36.4 °C (97.5 °F) 11/16/21 1421   Pulse 79 11/16/21 1634   Resp 14 11/16/21 1634   SpO2 99 % 11/16/21 1634   Vitals shown include unvalidated device data.

## 2021-11-16 NOTE — H&P
Date of Surgery Update:  Suzanne Mcgraw was seen and examined. History and physical has been reviewed. The patient has been examined. There have been no significant clinical changes since the completion of the originally dated History and Physical.  Past Medical History:   Diagnosis Date    Asthma     [facility]ervical [facility] ([facility] [address]     Radiation    Glaucoma     Hypertension     Low vitamin D level 11/02/2021    Sarcoidosis, lung ([facility] [address]      No current facility-administered medications on file prior to encounter. Current Outpatient Medications on File Prior to Encounter   Medication Sig Dispense Refill    albuterol (PROAIR HFA) 90 mcg/actuation inhaler Take 2 Puffs by inhalation as needed for Wheezing.  spironolactone (ALDACTONE) 25 mg tablet Take 25 mg by mouth daily.  pravastatin (PRAVACHOL) 10 mg tablet Take  by mouth nightly.  latanoprost (XALATAN) 0.005 % ophthalmic solution Administer 1 Drop to both eyes nightly.  aspirin 81 mg chewable tablet Take 81 mg by mouth daily.  lisinopril (PRINIVIL, ZESTRIL) 10 mg tablet Take 10 mg by mouth nightly. No Known Allergies    Patient identified by surgeon; surgical site was confirmed by patient and surgeon. Patient reports axial pain 6, radiating pain 6-7, left greater than right  Numbness -both legs  Weakness- both legs, she has 2/5 left quad, 3/5 DF on left  Assistive device for ambulation -yes, using walker due to leg weakness  Vit D: yes    I re-reviewed risks and benefits of surgery today, expected hospital course, and patient wishes to proceed with surgery.          Signed By: Dottie Cooper MD     November 16, 2021 7:51 AM

## 2021-11-16 NOTE — ANESTHESIA PREPROCEDURE EVALUATION
Relevant Problems   No relevant active problems       Anesthetic History               Review of Systems / Medical History  Patient summary reviewed, nursing notes reviewed and pertinent labs reviewed    Pulmonary    COPD: moderate        Asthma     Comments: sarcoid   Neuro/Psych   Within defined limits           Cardiovascular    Hypertension              Exercise tolerance: >4 METS     GI/Hepatic/Renal  Within defined limits              Endo/Other  Within defined limits           Other Findings              Physical Exam    Airway  Mallampati: I  TM Distance: > 6 cm  Neck ROM: normal range of motion   Mouth opening: Normal     Cardiovascular    Rhythm: regular  Rate: normal         Dental  No notable dental hx       Pulmonary  Breath sounds clear to auscultation               Abdominal         Other Findings            Anesthetic Plan    ASA: 3  Anesthesia type: general          Induction: Intravenous  Anesthetic plan and risks discussed with: Patient

## 2021-11-16 NOTE — BRIEF OP NOTE
Brief Postoperative Note    Patient: Robin Oseguera  YOB: 1950  MRN: [id number]    Date of Procedure: 11/16/2021     Pre-Op Diagnosis: LEFT LEG WEAKNESS, LUMBAR RADICULOPATHY, SPINAL STENOSIS OF LUMBAR REGION WITH NEUROGENIC CLAUDICATION, SPONDYLOLISTHESIS OF LUMBAR REGION    Post-Op Diagnosis: Same as preoperative diagnosis. Procedure(s):  L2-5 LAMINECTOMY AND DECOMPRESSION, L2-5 POSTERIOR FUSION WITH IMAGE GUIDANCE, ALLOGRAFT (O-ARM)    Surgeon(s):  Jaqueline Nguyen MD    Surgical Assistant: Nurse Practitioner: Maverick Keyes NP    Anesthesia: General     Estimated Blood Loss (mL): 879     Complications: None    Specimens: * No specimens in log *     Implants:   Implant Name Type Inv. Item Serial No.  Lot No. LRB No. Used Action   GRAFT BONE 5 CC - [id number]  GRAFT BONE 5 CC [id number] [employer] [id number] N/A 1 Implanted   GRAFT BONE 10 CC - [id number]  GRAFT BONE 10 CC [id number] [employer] [id number] N/A 1 Implanted   ViBone Moldable 10cc   [id number] [employer] [id number] N/A 1 Implanted   ViBone Moldable 5cc   [id number] [employer] N/A N/A 1 Implanted   Lily Dale DBM   [id number] [employer] N/A N/A 1 Implanted   SCR SET SPNE STREAMLINE TL --  - SN/A  SCR SET SPNE STREAMLINE TL --  N/A [employer] N/A N/A 8 Implanted   SCREW SPNL L45MM DIA7. 5MM THORLUM TI ALLY POLYAX STREAMLINE - SN/A  SCREW SPNL L45MM DIA7. 5MM THORLUM TI ALLY POLYAX STREAMLINE N/A [employer] N/A N/A 8 Implanted   Rods Raji  N/A  N/A N/A 2 Implanted       Drains:   Hemovac Lower Back (Active)   Site Assessment Clean, dry, & intact 11/16/21 1309   Dressing Status Clean, dry, & intact 11/16/21 1309   Drainage Description Serosanguinous 11/16/21 1309   Status Patent; Charged; Draining 11/16/21 1309       Findings: severe stenosis L2-4, spondylolisthesis L4-5, severe facet arthropathy.     Electronically Signed by Nik Loredo MD on 11/16/2021 at 1:37 PM

## 2021-11-16 NOTE — PROGRESS NOTES
Patient seen in recovery room   Post op pain is well controlled  Extubated uneventfully      Patient Vitals for the past 4 hrs:   Temp Pulse Resp BP SpO2   11/16/21 1435  76 10 (!) 103/56 100 %   11/16/21 1430  73 12 (!) 110/59 100 %   11/16/21 1425  76 17 (!) 110/55 100 %   11/16/21 1421 97.5 °F (36.4 °C) 75 14 (!) 111/58 99 %   11/16/21 1420  76 13 (!) 108/58 99 %   11/16/21 1416  74 14 (!) 111/58 100 %      Sleepy, arousable, follows some commands  Dressing clean and dry, PATRICE is functioning  hemovac in place and functioning with minimal output   Moving all the extremities  Stable postop L2-L5 posterior Lumbar fusion    -periop antibiotics  -SCD's  - Clear liquids, advance to full liquids when tolerates.    -mobilize, Lumbar Orthosis, LSO to be provided from the hospital  -pain control  As needed with PO?IV medicatios.

## 2021-11-16 NOTE — PERIOP NOTES
TRANSFER - OUT REPORT:    Verbal report given to Elizabeth Smith RN on Christina Ye  being transferred to [phone] for routine post - op       Report consisted of patients Situation, Background, Assessment and   Recommendations(SBAR). Information from the following report(s) SBAR, Kardex, OR Summary, Procedure Summary, Intake/Output, MAR, Accordion, Recent Results, Med Rec Status and Cardiac Rhythm NSR was reviewed with the receiving nurse. Lines:   Peripheral IV 11/16/21 Right Antecubital (Active)   Site Assessment Clean, dry, & intact 11/16/21 1700   Phlebitis Assessment 0 11/16/21 1700   Infiltration Assessment 0 11/16/21 1700   Dressing Status Clean, dry, & intact 11/16/21 1700   Dressing Type Transparent; Tape 11/16/21 1700   Hub Color/Line Status Pink; Flushed; Infusing 11/16/21 1700   Action Taken Open ports on tubing capped 11/16/21 1700   Alcohol Cap Used Yes 11/16/21 1700       Peripheral IV 11/16/21 Left Wrist (Active)   Site Assessment Clean, dry, & intact 11/16/21 1700   Phlebitis Assessment 0 11/16/21 1700   Infiltration Assessment 0 11/16/21 1700   Dressing Status Clean, dry, & intact 11/16/21 1700   Dressing Type Transparent; Tape 11/16/21 1700   Hub Color/Line Status Green; Flushed; Capped 11/16/21 1700   Alcohol Cap Used Yes 11/16/21 1700        Opportunity for questions and clarification was provided.       Patient transported with:   Registered Nurse

## 2021-11-16 NOTE — ADDENDUM NOTE
Addendum  created 11/16/21 1713 by Shaunna Cain CRNA    Intraprocedure Meds edited, Orders acknowledged in Narrator

## 2021-11-17 LAB
ANION GAP SERPL CALC-SCNC: 6 MMOL/L (ref 5–15)
BUN SERPL-MCNC: 10 MG/DL (ref 6–20)
BUN/CREAT SERPL: 12 (ref 12–20)
CALCIUM SERPL-MCNC: 8.3 MG/DL (ref 8.5–10.1)
CHLORIDE SERPL-SCNC: 109 MMOL/L (ref 97–108)
CO2 SERPL-SCNC: 27 MMOL/L (ref 21–32)
CREAT SERPL-MCNC: 0.86 MG/DL (ref 0.55–1.02)
GLUCOSE SERPL-MCNC: 106 MG/DL (ref 65–100)
HGB BLD-MCNC: 10.8 G/DL (ref 11.5–16)
POTASSIUM SERPL-SCNC: 4.4 MMOL/L (ref 3.5–5.1)
SODIUM SERPL-SCNC: 142 MMOL/L (ref 136–145)

## 2021-11-17 PROCEDURE — 97530 THERAPEUTIC ACTIVITIES: CPT

## 2021-11-17 PROCEDURE — 51798 US URINE CAPACITY MEASURE: CPT

## 2021-11-17 PROCEDURE — 97161 PT EVAL LOW COMPLEX 20 MIN: CPT

## 2021-11-17 PROCEDURE — 74011250636 HC RX REV CODE- 250/636: Performed by: NURSE PRACTITIONER

## 2021-11-17 PROCEDURE — 80048 BASIC METABOLIC PNL TOTAL CA: CPT

## 2021-11-17 PROCEDURE — 97535 SELF CARE MNGMENT TRAINING: CPT | Performed by: OCCUPATIONAL THERAPIST

## 2021-11-17 PROCEDURE — 94760 N-INVAS EAR/PLS OXIMETRY 1: CPT

## 2021-11-17 PROCEDURE — 85018 HEMOGLOBIN: CPT

## 2021-11-17 PROCEDURE — 65270000029 HC RM PRIVATE

## 2021-11-17 PROCEDURE — 97165 OT EVAL LOW COMPLEX 30 MIN: CPT | Performed by: OCCUPATIONAL THERAPIST

## 2021-11-17 PROCEDURE — 36415 COLL VENOUS BLD VENIPUNCTURE: CPT

## 2021-11-17 PROCEDURE — 74011250637 HC RX REV CODE- 250/637: Performed by: NURSE PRACTITIONER

## 2021-11-17 PROCEDURE — 2709999900 HC NON-CHARGEABLE SUPPLY

## 2021-11-17 PROCEDURE — L0627 LO SAG RI AN/POS PNL PRE CST: HCPCS

## 2021-11-17 PROCEDURE — 74011000250 HC RX REV CODE- 250: Performed by: NURSE PRACTITIONER

## 2021-11-17 PROCEDURE — 97116 GAIT TRAINING THERAPY: CPT

## 2021-11-17 RX ADMIN — KETOROLAC TROMETHAMINE 15 MG: 30 INJECTION, SOLUTION INTRAMUSCULAR at 12:12

## 2021-11-17 RX ADMIN — Medication 10 ML: at 23:20

## 2021-11-17 RX ADMIN — MONTELUKAST 10 MG: 10 TABLET, FILM COATED ORAL at 10:23

## 2021-11-17 RX ADMIN — PRAVASTATIN SODIUM 10 MG: 10 TABLET ORAL at 21:48

## 2021-11-17 RX ADMIN — ACETAMINOPHEN 1000 MG: 500 TABLET ORAL at 10:23

## 2021-11-17 RX ADMIN — POLYETHYLENE GLYCOL 3350 17 G: 17 POWDER, FOR SOLUTION ORAL at 10:24

## 2021-11-17 RX ADMIN — KETOROLAC TROMETHAMINE 15 MG: 30 INJECTION, SOLUTION INTRAMUSCULAR at 01:04

## 2021-11-17 RX ADMIN — ACETAMINOPHEN 1000 MG: 500 TABLET ORAL at 18:52

## 2021-11-17 RX ADMIN — OXYCODONE 5 MG: 5 TABLET ORAL at 21:51

## 2021-11-17 RX ADMIN — ACETAMINOPHEN 1000 MG: 500 TABLET ORAL at 08:15

## 2021-11-17 RX ADMIN — DOCUSATE SODIUM 50MG AND SENNOSIDES 8.6MG 1 TABLET: 8.6; 5 TABLET, FILM COATED ORAL at 10:23

## 2021-11-17 RX ADMIN — CEFAZOLIN 2 G: 1 INJECTION, POWDER, FOR SOLUTION INTRAMUSCULAR; INTRAVENOUS at 12:12

## 2021-11-17 RX ADMIN — LISINOPRIL 10 MG: 5 TABLET ORAL at 21:48

## 2021-11-17 RX ADMIN — ACETAMINOPHEN 1000 MG: 500 TABLET ORAL at 01:04

## 2021-11-17 RX ADMIN — Medication 10 ML: at 08:18

## 2021-11-17 RX ADMIN — KETOROLAC TROMETHAMINE 15 MG: 30 INJECTION, SOLUTION INTRAMUSCULAR at 08:15

## 2021-11-17 RX ADMIN — SPIRONOLACTONE 25 MG: 25 TABLET ORAL at 10:23

## 2021-11-17 RX ADMIN — CEFAZOLIN 2 G: 1 INJECTION, POWDER, FOR SOLUTION INTRAMUSCULAR; INTRAVENOUS at 05:41

## 2021-11-17 RX ADMIN — DOCUSATE SODIUM 50MG AND SENNOSIDES 8.6MG 1 TABLET: 8.6; 5 TABLET, FILM COATED ORAL at 18:52

## 2021-11-17 RX ADMIN — FAMOTIDINE 20 MG: 20 TABLET, FILM COATED ORAL at 10:24

## 2021-11-17 RX ADMIN — FAMOTIDINE 20 MG: 20 TABLET, FILM COATED ORAL at 18:52

## 2021-11-17 RX ADMIN — MAGNESIUM HYDROXIDE 15 ML: 400 SUSPENSION ORAL at 18:52

## 2021-11-17 RX ADMIN — MAGNESIUM HYDROXIDE 15 ML: 400 SUSPENSION ORAL at 10:24

## 2021-11-17 NOTE — PROGRESS NOTES
CARE MANAGEMENT INITIAL ASSESSEMENT      NAME:   David Sanchez   :     1950   MRN:     [id number]       Emergency Contact:  Extended Emergency Contact Information  Primary Emergency Contact: Amanda Hernández Phone: [phone]  Mobile Phone: [phone]  Relation: Daughter  Secondary Emergency Contact: Zoë Hernández Phone: [phone]  Mobile Phone: [phone]  Relation: Sister    Advance Directive:  No Order, does not have an advance directive. [employer] Healthcare Decision Maker:   Kobe More- daughter- [phone]    Reason for Admission:  Ms. Lucita Dalton is a 70 y.o. female with history that includes asthma, cervical cancer and HTN  who was electively admitted for:  lumbar fusion    Patient Active Problem List   Diagnosis Code    Lumbar spinal stenosis M48.061       Assessment: In person with patient. RUR:  3%  Risk Level:  Low  Value-based purchasing:   No  Bundle patient:  No    Residency:  Private residence  Exterior Steps:  6  Interior Steps:  Pt lives in a split level. Pt indicates about 8 stairs to bedroom. Lives With:  Other family member(s) Carlos Leyva (sister)    Prior functioning:  Independent.   Patient requires assistance with:  N/A    Prior DME required:  Lance Mcdowell walker and Shower chair    DME available:  Crutches, Rolling walker and Shower chair    Rehab history:  None    Discharge Concerns:  None      Insurer:  Payor: [employer] / Plan: [facility] HMO / Product Type: Managed Care Medicare /     PCP: Desi Elias MD   Name of Practice:  [facility]   Address:         [address]   Phone:          [phone]   Current patient: Yes   Approximate date of last visit: April   Access to virtual PCP visits:  Yes    Pharmacy:  [place] [address] [address] [facility]           [phone] vaccination status:  Fully vaccinated in March    AL Transport:  Family      Transition of care plan:  Home with Home Health    Comments:   Pt admitted on 11/16/21 for an elective lumbar fusion. CM met with Pt to complete initial assessment. Pt states she lives at home with her sister Sahra Reaves). Pt has no hx of HH or home O2. Pt has crutches, walker, and built in shower chair. At baseline, Pt reports she was independent with ADLs. Pt denied problems with ADLs. Pt reports that she ambulates with a walker. Pt denies any hx of falls with the walker. Last fall was 12/2020. Pt has PT and OT evaluations pending. OT coordinated with CM stating that Pt would need [facility] OT. Discharge plan is for Pt to return home with [facility]. Family will transport upon discharge. CM will continue to follow. 3:25 PM  PT and OT evaluations are completed- both are recommending HH PT/OT. CM met with Pt. Pt is agreeable to [facility] services. Pt would like to use [facility]. Patient choice letter signed. CM sent referral to [facility]. [facility] declined referral as they are out of network. CM informed only [facility] agency in [facility] is [facility]. CM coordinated with Pt. Pt is agreeable to using [facility]. Referral sent to [facility] via [employer]. CM waiting on decision. 3:45 PM  [facility] has accepted PENDING insurance auth.    _____________________________________  [facility] - Care Management  11/17/2021   10:54 AM      Care Management Interventions  PCP Verified by CM: Yes Damon Carr MD)  Mode of Transport at Discharge:  Other (see comment) (family)  Transition of Care Consult (CM Consult): [address]: No  Reason Outside [place]: Physician referred to specific agency  MyChart Signup: No  Discharge Durable Medical Equipment: No  Physical Therapy Consult: Yes  Occupational Therapy Consult: Yes  Speech Therapy Consult: No  Support Systems: Other Family Member(s), Child(ruthie)  Confirm Follow Up Transport: Family  Discharge Location  Discharge Placement: Home with home health

## 2021-11-17 NOTE — PROGRESS NOTES
Problem: Mobility Impaired (Adult and Pediatric)  Goal: *Acute Goals and Plan of Care (Insert Text)  Description: FUNCTIONAL STATUS PRIOR TO ADMISSION: Patient was modified independent and active with use of RW. HOME SUPPORT PRIOR TO ADMISSION: The patient lived with her sister but did not require assistance. Physical Therapy Goals  Initiated 11/17/2021    1. Patient will move from supine to sit and sit to supine , scoot up and down, and roll side to side in bed with modified independence within 4 days. 2. Patient will perform sit to stand with modified independence within 4 days. 3. Patient will ambulate with modified independence for 150 feet with the least restrictive device within 4 days. 4. Patient will ascend/descend 9 stairs with 1 handrail(s) with supervision/set-up within 4 days. 5. Patient will verbalize and demonstrate understanding of spinal precautions (No bending, lifting greater than 5 lbs, or twisting; log-roll technique; frequent repositioning as instructed) within 4 days. 11/17/2021 1557 by Paradise Mcintosh PT  Outcome: Progressing Towards Goal   PHYSICAL THERAPY TREATMENT  Patient: Suzanne Mcgraw (41 y.o. female)  Date: 11/17/2021  Diagnosis: Lumbar spinal stenosis [M48.061]   <principal problem not specified>  Procedure(s) (LRB):  L2-5 LAMINECTOMY AND DECOMPRESSION, L2-5 POSTERIOR FUSION WITH IMAGE GUIDANCE, ALLOGRAFT (O-ARM) (N/A) 1 Day Post-Op  Precautions: Fall, Back (LSO brace when OOB) No bending, no lifting greater than 5 lbs, no twisting, log-roll technique, repositioning every 20-30 min except when sleeping, brace when OOB (if ordered)  Chart, physical therapy assessment, plan of care and goals were reviewed. ASSESSMENT  Patient continues with skilled PT services and is progressing towards goals. Pt received sitting on EOB with RN. Mod A to stand from EOB, Min A from commode with grab bar. Pt ambulates with increased ease and improved gait speed.  Progressed gait distance to 65ft without standing rest break. Returned to supine per RN request for bladder scan. Pt demos good carryover of back precautions and IS use. Will likely be ready for stair training Friday. Current Level of Function Impacting Discharge (mobility/balance): Min-Mod A to stand from surfaces    Other factors to consider for discharge: weakness and falls PTA, good support from sister         PLAN :  Patient continues to benefit from skilled intervention to address the above impairments. Continue treatment per established plan of care. to address goals. Recommendation for discharge: (in order for the patient to meet his/her long term goals)  Physical therapy at least 2 days/week in the home     This discharge recommendation:  Has been made in collaboration with the attending provider and/or case management    IF patient discharges home will need the following DME: patient owns DME required for discharge       SUBJECTIVE:   Patient stated I just set the walker up like this last week.     OBJECTIVE DATA SUMMARY:   Critical Behavior:  Neurologic State: Alert, Appropriate for age, Eyes open spontaneously  Orientation Level: Oriented X4  Cognition: Appropriate for age attention/concentration  Safety/Judgement: Awareness of environment, Fall prevention, Insight into deficits    Spinal diagnosis intervention:  The patient stated 2/3 back precautions when prompted. Reviewed all 3 back precautions, log roll technique, and sitting for 30 minutes at a time. The patient required verbal cues to maintain back precautions during functional activity. Reviewed back brace application and wear schedule. Brace donned with minimal assistance/contact guard assist      Functional Mobility Training:    Bed Mobility:  Log Rolling: Minimum assistance  Supine to Sit: Minimum assistance; Assist x1; Additional time  Sit to Supine: Moderate assistance  Scooting: Contact guard assistance;  Additional time; Assist x1 Transfers:  Sit to Stand: Moderate assistance  Stand to Sit: Minimum assistance        Bed to Chair: Minimum assistance                    Balance:  Sitting: Intact  Standing: Impaired  Standing - Static: Good  Standing - Dynamic : Fair  Ambulation/Gait Training:  Distance (ft): 65 Feet (ft)  Assistive Device: Gait belt; Walker, rolling; Brace/Splint  Ambulation - Level of Assistance: Minimal assistance        Gait Abnormalities: Decreased step clearance; Step to gait        Base of Support: Widened     Speed/Sabrina: Pace decreased (<100 feet/min)  Step Length: Left shortened; Right shortened                  Activity Tolerance:   Good    After treatment patient left in no apparent distress:   Supine in bed, Call bell within reach, and Side rails x 3    COMMUNICATION/COLLABORATION:   The patients plan of care was discussed with: Registered nurse.      Dave Iverson, PT   Time Calculation: 32 mins

## 2021-11-17 NOTE — PROGRESS NOTES
Problem: Mobility Impaired (Adult and Pediatric)  Goal: *Acute Goals and Plan of Care (Insert Text)  Description: FUNCTIONAL STATUS PRIOR TO ADMISSION: Patient was modified independent and active with use of RW. HOME SUPPORT PRIOR TO ADMISSION: The patient lived with her sister but did not require assistance. Physical Therapy Goals  Initiated 11/17/2021    1. Patient will move from supine to sit and sit to supine , scoot up and down, and roll side to side in bed with modified independence within 4 days. 2. Patient will perform sit to stand with modified independence within 4 days. 3. Patient will ambulate with modified independence for 150 feet with the least restrictive device within 4 days. 4. Patient will ascend/descend 9 stairs with 1 handrail(s) with supervision/set-up within 4 days. 5. Patient will verbalize and demonstrate understanding of spinal precautions (No bending, lifting greater than 5 lbs, or twisting; log-roll technique; frequent repositioning as instructed) within 4 days. 11/17/2021 1329 by Anuel Rodriges, PT  Outcome: Progressing Towards Goal   PHYSICAL THERAPY EVALUATION  Patient: Khari Chopra (57 y.o. female)  Date: 11/17/2021  Primary Diagnosis: Lumbar spinal stenosis [M48.061]  Procedure(s) (LRB):  L2-5 LAMINECTOMY AND DECOMPRESSION, L2-5 POSTERIOR FUSION WITH IMAGE GUIDANCE, ALLOGRAFT (O-ARM) (N/A) 1 Day Post-Op   Precautions:   Fall, Back (LSO brace when OOB)    ASSESSMENT  Based on the objective data described below, the patient presents with back pain, decreased strength BLEs, impaired sensation BLEs, decreased endurance, mobility, balance in standing and safety POD 1 back sx. Educated pt on her back precautions and LSO management and she verbalized good understanding. Reports she sat up in chair for 30 minutes earlier this morning. Requires Min A for bed mobility, Min A x1 to stand and Min A x 1 to ambulate into bathroom and to doorway.  Noted LLE hyperextension and pt reports multiple falls (2-3 in the last year) d/t LE weakness. Pt tolerated mobility well and anticipate steady progress acutely. Current Level of Function Impacting Discharge (mobility/balance): Min A for mobility    Functional Outcome Measure: The patient scored 50/100 on the Barthel outcome measure. Other factors to consider for discharge: history of falls     Patient will benefit from skilled therapy intervention to address the above noted impairments. PLAN :  Recommendations and Planned Interventions: bed mobility training, transfer training, gait training, therapeutic exercises, neuromuscular re-education, patient and family training/education, and therapeutic activities      Frequency/Duration: Patient will be followed by physical therapy:  twice daily to address goals. Recommendation for discharge: (in order for the patient to meet his/her long term goals)  Physical therapy at least 2 days/week in the home     This discharge recommendation:  Has been made in collaboration with the attending provider and/or case management    IF patient discharges home will need the following DME: patient owns DME required for discharge         SUBJECTIVE:   Patient stated Rahul Ang had to have knee surgery before I could have back surgery.     OBJECTIVE DATA SUMMARY:   HISTORY:    Past Medical History:   Diagnosis Date    Asthma     Cervical cancer ([facility] [address]     Radiation    Glaucoma     Hypertension     Low vitamin D level 11/02/2021    Sarcoidosis, lung ([facility] [address]      Past Surgical History:   Procedure Laterality Date    COLONOSCOPY Left 4/22/2019    COLONOSCOPY performed by Melinda Horton MD at [facility] ENDOSCOPY    HX GI  04/2019    COLONOSCOPY    HX GYN      cervical radiation x 2    HX HEENT      mole removal from  eyelid    HX ORTHOPAEDIC Left 2014    HEEL SPURS    HX TENDON / LIGAMENT TRANSPLANT Left 04/2021    Knee-     HX UROLOGICAL      BLADDER SLING    MA CHEST SURGERY PROCEDURE UNLISTED  1980'S LUNG BIOPSY SARCOIDOSIS       Personal factors and/or comorbidities impacting plan of care: cervical cancer, HTN, obesity    Home Situation  Home Environment: Private residence  # Steps to Enter: 9 (2 HENNA and 7 steps t living level)  Rails to Enter: Yes  Hand Rails : Bilateral  One/Two Story Residence: Split level  # of Interior Steps: 7  Living Alone: No  Support Systems: Other Family Member(s), Child(ruthie)  Patient Expects to be Discharged to[de-identified] House  Current DME Used/Available at Home: Crutches, Commode, bedside, Walker, rolling  Tub or Shower Type: Shower (built in seat)    EXAMINATION/PRESENTATION/DECISION MAKING:   Critical Behavior:  Neurologic State: Alert, Appropriate for age  Orientation Level: Oriented X4  Cognition: Appropriate for age attention/concentration  Safety/Judgement: Awareness of environment, Fall prevention, Insight into deficits  Hearing: Auditory  Auditory Impairment: None  Hearing Aids/Status: Does not own  Skin:    Edema:   Range Of Motion:  AROM: Generally decreased, functional (hyperextension of L knee)           PROM: Generally decreased, functional           Strength:    Strength: Generally decreased, functional                    Tone & Sensation:   Tone: Normal              Sensation: Impaired (BLEs)               Coordination:  Coordination: Generally decreased, functional  Vision:   Acuity: Within Defined Limits  Functional Mobility:  Bed Mobility:  Rolling: Minimum assistance; Assist x1; Additional time  Supine to Sit: Minimum assistance; Assist x1; Additional time  Sit to Supine: Moderate assistance; Assist x1  Scooting: Contact guard assistance;  Additional time; Assist x1  Transfers:  Sit to Stand: Minimum assistance; Assist x1; Additional time  Stand to Sit: Minimum assistance; Assist x1; Additional time        Bed to Chair: Minimum assistance; Assist x1; Additional time              Balance:   Sitting: Intact  Standing: Impaired  Standing - Static: Good; Constant support  Standing - Dynamic : Fair; Constant support  Ambulation/Gait Training:  Distance (ft): 25 Feet (ft)  Assistive Device: Gait belt; Walker, rolling; Brace/Splint  Ambulation - Level of Assistance: Minimal assistance; Assist x1        Gait Abnormalities: Antalgic; Decreased step clearance; Step to gait (hyperextension L knee)        Base of Support: Widened     Speed/Sabrina: Pace decreased (<100 feet/min); Slow  Step Length: Left shortened; Right shortened                     Stairs: Therapeutic Exercises:       Functional Measure:  Barthel Index:    Bathin  Bladder: 10  Bowels: 10  Groomin  Dressin  Feeding: 10  Mobility: 0  Stairs: 0  Toilet Use: 5  Transfer (Bed to Chair and Back): 5  Total: 50/100       The Barthel ADL Index: Guidelines  1. The index should be used as a record of what a patient does, not as a record of what a patient could do. 2. The main aim is to establish degree of independence from any help, physical or verbal, however minor and for whatever reason. 3. The need for supervision renders the patient not independent. 4. A patient's performance should be established using the best available evidence. Asking the patient, friends/relatives and nurses are the usual sources, but direct observation and common sense are also important. However direct testing is not needed. 5. Usually the patient's performance over the preceding 24-48 hours is important, but occasionally longer periods will be relevant. 6. Middle categories imply that the patient supplies over 50 per cent of the effort. 7. Use of aids to be independent is allowed. Ignacio Castrejon., Barthel, D.W. (3326). Functional evaluation: the Barthel Index. [address] [facility] (14)2. Miguel Zamudio rah ASHIA Salazar, Emily Briceño., Nikolai Yap., [place], [address] [place], [place] [address] (). Measuring the change indisability after inpatient rehabilitation; comparison of the responsiveness of the Barthel Index and Functional [place] Measure. Journal of Neurology, Neurosurgery, and Psychiatry, 66(4), 237-805. LAZARUS Paris.A, SHIVANI Babin, & Ella Chaidez M.A. (2004.) Assessment of post-stroke quality of life in cost-effectiveness studies: The usefulness of the Barthel Index and the EuroQoL-5D. Quality of Life Research, 15, 233-76            Physical Therapy Evaluation Charge Determination   History Examination Presentation Decision-Making   MEDIUM  Complexity : 1-2 comorbidities / personal factors will impact the outcome/ POC  MEDIUM Complexity : 3 Standardized tests and measures addressing body structure, function, activity limitation and / or participation in recreation  LOW Complexity : Stable, uncomplicated  Other outcome measures Barthel  LOW       Based on the above components, the patient evaluation is determined to be of the following complexity level: LOW     Pain Ratin/10    Activity Tolerance:   Good    After treatment patient left in no apparent distress:   Supine in bed, Call bell within reach, Bed / chair alarm activated, and Side rails x 3    COMMUNICATION/EDUCATION:   The patients plan of care was discussed with: Registered nurse. Fall prevention education was provided and the patient/caregiver indicated understanding., Patient/family have participated as able in goal setting and plan of care. , and Patient/family agree to work toward stated goals and plan of care.     Thank you for this referral.  Kait Restrepo PT   Time Calculation: 33 mins

## 2021-11-17 NOTE — PROGRESS NOTES
PT/this RN ambulated pt to BR to attempt to void/pass flatus. Pt was unable to perform either after 8 minutes. Pt returned to bed after ambulating in hallway. Notified Yamilka Rojas NP & Clau Blanco NP. Orders for straight cath received. Pt continues to have active bs & tolerating full liquid diet well.

## 2021-11-18 LAB
APPEARANCE UR: CLEAR
BACTERIA URNS QL MICRO: NEGATIVE /HPF
BILIRUB UR QL: NEGATIVE
COLOR UR: ABNORMAL
EPITH CASTS URNS QL MICRO: ABNORMAL /LPF
GLUCOSE UR STRIP.AUTO-MCNC: NEGATIVE MG/DL
HGB BLD-MCNC: 10.1 G/DL (ref 11.5–16)
HGB UR QL STRIP: ABNORMAL
HYALINE CASTS URNS QL MICRO: ABNORMAL /LPF (ref 0–5)
KETONES UR QL STRIP.AUTO: NEGATIVE MG/DL
LEUKOCYTE ESTERASE UR QL STRIP.AUTO: ABNORMAL
NITRITE UR QL STRIP.AUTO: NEGATIVE
PH UR STRIP: 7 [PH] (ref 5–8)
PROT UR STRIP-MCNC: NEGATIVE MG/DL
RBC #/AREA URNS HPF: ABNORMAL /HPF (ref 0–5)
SP GR UR REFRACTOMETRY: 1.01 (ref 1–1.03)
UA: UC IF INDICATED,UAUC: ABNORMAL
UROBILINOGEN UR QL STRIP.AUTO: 0.2 EU/DL (ref 0.2–1)
WBC URNS QL MICRO: ABNORMAL /HPF (ref 0–4)

## 2021-11-18 PROCEDURE — 94760 N-INVAS EAR/PLS OXIMETRY 1: CPT

## 2021-11-18 PROCEDURE — 85018 HEMOGLOBIN: CPT

## 2021-11-18 PROCEDURE — 97530 THERAPEUTIC ACTIVITIES: CPT

## 2021-11-18 PROCEDURE — 97535 SELF CARE MNGMENT TRAINING: CPT

## 2021-11-18 PROCEDURE — 77030038269 HC DRN EXT URIN PURWCK BARD -A

## 2021-11-18 PROCEDURE — 74011250636 HC RX REV CODE- 250/636: Performed by: NURSE PRACTITIONER

## 2021-11-18 PROCEDURE — 36415 COLL VENOUS BLD VENIPUNCTURE: CPT

## 2021-11-18 PROCEDURE — 81001 URINALYSIS AUTO W/SCOPE: CPT

## 2021-11-18 PROCEDURE — 97116 GAIT TRAINING THERAPY: CPT

## 2021-11-18 PROCEDURE — 65270000029 HC RM PRIVATE

## 2021-11-18 PROCEDURE — 74011250637 HC RX REV CODE- 250/637: Performed by: NURSE PRACTITIONER

## 2021-11-18 RX ORDER — ENOXAPARIN SODIUM 100 MG/ML
40 INJECTION SUBCUTANEOUS EVERY 24 HOURS
Status: DISCONTINUED | OUTPATIENT
Start: 2021-11-18 | End: 2021-11-19 | Stop reason: HOSPADM

## 2021-11-18 RX ORDER — ADHESIVE BANDAGE
15 BANDAGE TOPICAL DAILY PRN
Status: DISCONTINUED | OUTPATIENT
Start: 2021-11-18 | End: 2021-11-19 | Stop reason: HOSPADM

## 2021-11-18 RX ADMIN — Medication 10 ML: at 23:00

## 2021-11-18 RX ADMIN — SPIRONOLACTONE 25 MG: 25 TABLET ORAL at 09:39

## 2021-11-18 RX ADMIN — DOCUSATE SODIUM 50MG AND SENNOSIDES 8.6MG 1 TABLET: 8.6; 5 TABLET, FILM COATED ORAL at 09:39

## 2021-11-18 RX ADMIN — ENOXAPARIN SODIUM 40 MG: 100 INJECTION SUBCUTANEOUS at 22:50

## 2021-11-18 RX ADMIN — ACETAMINOPHEN 1000 MG: 500 TABLET ORAL at 06:19

## 2021-11-18 RX ADMIN — MAGNESIUM HYDROXIDE 15 ML: 400 SUSPENSION ORAL at 09:39

## 2021-11-18 RX ADMIN — FAMOTIDINE 20 MG: 20 TABLET, FILM COATED ORAL at 18:00

## 2021-11-18 RX ADMIN — Medication 10 ML: at 06:20

## 2021-11-18 RX ADMIN — FAMOTIDINE 20 MG: 20 TABLET, FILM COATED ORAL at 09:39

## 2021-11-18 RX ADMIN — Medication 10 ML: at 18:17

## 2021-11-18 RX ADMIN — PRAVASTATIN SODIUM 10 MG: 10 TABLET ORAL at 22:51

## 2021-11-18 RX ADMIN — ACETAMINOPHEN 1000 MG: 500 TABLET ORAL at 22:50

## 2021-11-18 RX ADMIN — POLYETHYLENE GLYCOL 3350 17 G: 17 POWDER, FOR SOLUTION ORAL at 09:40

## 2021-11-18 RX ADMIN — ACETAMINOPHEN 1000 MG: 500 TABLET ORAL at 11:00

## 2021-11-18 RX ADMIN — OXYCODONE 5 MG: 5 TABLET ORAL at 19:02

## 2021-11-18 RX ADMIN — LISINOPRIL 10 MG: 5 TABLET ORAL at 22:55

## 2021-11-18 RX ADMIN — ACETAMINOPHEN 1000 MG: 500 TABLET ORAL at 18:17

## 2021-11-18 RX ADMIN — MONTELUKAST 10 MG: 10 TABLET, FILM COATED ORAL at 09:39

## 2021-11-18 NOTE — PROGRESS NOTES
Problem: Self Care Deficits Care Plan (Adult)  Goal: *Acute Goals and Plan of Care (Insert Text)  Description: FUNCTIONAL STATUS PRIOR TO ADMISSION: Patient was modified independent using a rolling walker for functional mobility. Sister has been providing transportation. HOME SUPPORT: The patient lived with sister but did not require assist.    Occupational Therapy Goals  Initiated 11/17/2021    1. Patient will perform lower body dressing with supervision/set-up using adaptive equipment PRN within 7 days. 2.  Patient will perform toileting and toilet transfer with supervision/set-up using most appropriate DME within 7 days. 3.  Patient will groom standing at sink at supervision/set-up within 7 days. 4.  Patient will don/doff LSO back brace at modified independence within 7 days. 5.  Patient will verbalize/demonstrate 3/3 back precautions during ADL tasks without cues within 7 days. Outcome: Progressing Towards Goal   OCCUPATIONAL THERAPY TREATMENT  Patient: Hope Loss (66 y.o. female)  Date: 11/18/2021  Diagnosis: Lumbar spinal stenosis [M48.061]   <principal problem not specified>  Procedure(s) (LRB):  L2-5 LAMINECTOMY AND DECOMPRESSION, L2-5 POSTERIOR FUSION WITH IMAGE GUIDANCE, ALLOGRAFT (O-ARM) (N/A) 2 Days Post-Op  Precautions: Fall, Back (LSO brace when OOB)  Chart, occupational therapy assessment, plan of care, and goals were reviewed. ASSESSMENT  Patient continues with skilled OT services and is progressing towards goals. Pt sat edge of bed, stated her chux was wet and stood with moderate assist to ambulate to restroom. She was able to perform seated hygiene before standing to ambulate to chair. She bathed seated and was educated as to AE for bathing. She performed UB dress with stand by assist and was educated as AE for LB dressing. Pt aware of all her spinal precautions. Current Level of Function Impacting Discharge (ADLs):  Moderate assist sit to stand for ADL tasks, stand by assist UB bathe/dress    Other factors to consider for discharge:          PLAN :  Patient continues to benefit from skilled intervention to address the above impairments. Continue treatment per established plan of care to address goals. Recommend with staff: out of bed for ADL tasks, meals, there ex, there act    Recommend next OT session: cont towards goals    Recommendation for discharge: (in order for the patient to meet his/her long term goals)  Occupational therapy at least 2 days/week in the home     This discharge recommendation:  Has not yet been discussed the attending provider and/or case management    IF patient discharges home will need the following DME:        SUBJECTIVE:   Patient stated How do I wash my legs?     OBJECTIVE DATA SUMMARY:   Cognitive/Behavioral Status:  Neurologic State: Alert  Orientation Level: Oriented X4  Cognition: Follows commands             Functional Mobility and Transfers for ADLs:  Bed Mobility:  Supine to Sit: Moderate assistance; Assist x1; Additional time    Transfers:  Sit to Stand: Moderate assistance; Assist x1; Additional time  Functional Transfers  Toilet Transfer :  Moderate assistance  Adaptive Equipment: Grab bars  Bed to Chair: Minimum assistance    Balance:  Sitting: Intact  Standing: With support  Standing - Static: Constant support; Good  Standing - Dynamic : Fair    ADL Intervention:            Upper Body Bathing  Bathing Assistance: Stand-by assistance  Position Performed: Seated in chair  Cues: Verbal cues provided         Upper Body Dressing Assistance  Dressing Assistance: Contact guard assistance  Hospital Gown: Contact guard assistance         Toileting  Bowel Hygiene: Contact guard assistance       Activity Tolerance:   Fair    After treatment patient left in no apparent distress:   Sitting in chair and Call bell within reach    COMMUNICATION/COLLABORATION:   The patients plan of care was discussed with: Occupational therapist.     Ji Singer Suzy, JOHNS/L  Time Calculation: 22 mins

## 2021-11-18 NOTE — PROGRESS NOTES
Problem: Mobility Impaired (Adult and Pediatric)  Goal: *Acute Goals and Plan of Care (Insert Text)  Description: FUNCTIONAL STATUS PRIOR TO ADMISSION: Patient was modified independent and active with use of RW. HOME SUPPORT PRIOR TO ADMISSION: The patient lived with her sister but did not require assistance. Physical Therapy Goals  Initiated 11/17/2021    1. Patient will move from supine to sit and sit to supine , scoot up and down, and roll side to side in bed with modified independence within 4 days. 2. Patient will perform sit to stand with modified independence within 4 days. 3. Patient will ambulate with modified independence for 150 feet with the least restrictive device within 4 days. 4. Patient will ascend/descend 9 stairs with 1 handrail(s) with supervision/set-up within 4 days. 5. Patient will verbalize and demonstrate understanding of spinal precautions (No bending, lifting greater than 5 lbs, or twisting; log-roll technique; frequent repositioning as instructed) within 4 days. Outcome: Progressing Towards Goal  Note:   PHYSICAL THERAPY TREATMENT  Patient: Naseem Chopra (60 y.o. female)  Date: 11/18/2021  Diagnosis: Lumbar spinal stenosis [M48.061]   <principal problem not specified>  Procedure(s) (LRB):  L2-5 LAMINECTOMY AND DECOMPRESSION, L2-5 POSTERIOR FUSION WITH IMAGE GUIDANCE, ALLOGRAFT (O-ARM) (N/A) 2 Days Post-Op  Precautions: Fall, Back (LSO brace when OOB) No bending, no lifting greater than 5 lbs, no twisting, log-roll technique, repositioning every 20-30 min except when sleeping, brace when OOB (if ordered)  Chart, physical therapy assessment, plan of care and goals were reviewed. ASSESSMENT  Patient continues with skilled PT services and is progressing towards goals. Increased need for assistance with bed mobility. Min A to don LSO. CGA/ Min A for gait training on level surfaces using RW. PT will continue to progress.      Current Level of Function Impacting Discharge (mobility/balance): Malik     Other factors to consider for discharge:          PLAN :  Patient continues to benefit from skilled intervention to address the above impairments. Continue treatment per established plan of care. to address goals. Recommendation for discharge: (in order for the patient to meet his/her long term goals)  Physical therapy at least 2 days/week in the home AND ensure assist and/or supervision for safety with mobility    This discharge recommendation:  Has been made in collaboration with the attending provider and/or case management    IF patient discharges home will need the following DME: patient owns DME required for discharge       SUBJECTIVE:   Patient stated the pain is there.     OBJECTIVE DATA SUMMARY:   Critical Behavior:  Neurologic State: Alert, Appropriate for age  Orientation Level: Oriented X4  Cognition: Follows commands  Safety/Judgement: Awareness of environment, Fall prevention, Insight into deficits    Spinal diagnosis intervention:  The patient stated 3/3 back precautions when prompted. Reviewed all 3 back precautions, log roll technique, and sitting for 30 minutes at a time. The patient required few cues to maintain back precautions during functional activity. Reviewed back brace application and wear schedule. Brace donned with minimal assistance/contact guard assist      Functional Mobility Training:    Bed Mobility:  Log    Supine to Sit: Moderate assistance; Assist x1; Additional time              Transfers:  Sit to Stand:  Moderate assistance; Assist x1; Additional time  Stand to Sit: Minimum assistance        Bed to Chair: Minimum assistance                    Balance:  Sitting: Intact  Standing: With support  Standing - Static: Constant support; Good  Standing - Dynamic : Fair  Ambulation/Gait Training:  Distance (ft): 30 Feet (ft)  Assistive Device: Walker, rolling; Gait belt; Brace/Splint  Ambulation - Level of Assistance: Minimal assistance Gait Abnormalities: Decreased step clearance; Step to gait        Base of Support: Widened     Speed/Sabrina: Pace decreased (<100 feet/min)                       Stairs: Therapeutic Exercises:     Pain Ratin/10    Activity Tolerance:   Good    After treatment patient left in no apparent distress:   Sitting in chair, Call bell within reach, and Bed / chair alarm activated    COMMUNICATION/COLLABORATION:   The patients plan of care was discussed with: Registered nurse.      Jose Barroso   Time Calculation: 29 mins

## 2021-11-18 NOTE — PROGRESS NOTES
Bedside and Verbal shift change report given to Jin CONNELLY RN (oncoming nurse) by Lynn RN (offgoing nurse). Report included the following information SBAR, Kardex, Intake/Output and Recent Results. Pt in bed resting quietly w/ no new complaints drinking coffee.

## 2021-11-18 NOTE — PROGRESS NOTES
Problem: Mobility Impaired (Adult and Pediatric)  Goal: *Acute Goals and Plan of Care (Insert Text)  Description: FUNCTIONAL STATUS PRIOR TO ADMISSION: Patient was modified independent and active with use of RW. HOME SUPPORT PRIOR TO ADMISSION: The patient lived with her sister but did not require assistance. Physical Therapy Goals  Initiated 11/17/2021    1. Patient will move from supine to sit and sit to supine , scoot up and down, and roll side to side in bed with modified independence within 4 days. 2. Patient will perform sit to stand with modified independence within 4 days. 3. Patient will ambulate with modified independence for 150 feet with the least restrictive device within 4 days. 4. Patient will ascend/descend 9 stairs with 1 handrail(s) with supervision/set-up within 4 days. 5. Patient will verbalize and demonstrate understanding of spinal precautions (No bending, lifting greater than 5 lbs, or twisting; log-roll technique; frequent repositioning as instructed) within 4 days. 11/18/2021 1503 by Gil Jane  Outcome: Progressing Towards Goal  Note:   PHYSICAL THERAPY TREATMENT  Patient: Dinorah Cordon (88 y.o. female)  Date: 11/18/2021  Diagnosis: Lumbar spinal stenosis [M48.061]   <principal problem not specified>  Procedure(s) (LRB):  L2-5 LAMINECTOMY AND DECOMPRESSION, L2-5 POSTERIOR FUSION WITH IMAGE GUIDANCE, ALLOGRAFT (O-ARM) (N/A) 2 Days Post-Op  Precautions: Fall, Back (LSO brace when OOB) No bending, no lifting greater than 5 lbs, no twisting, log-roll technique, repositioning every 20-30 min except when sleeping, brace when OOB (if ordered)  Chart, physical therapy assessment, plan of care and goals were reviewed. ASSESSMENT  Patient continues with skilled PT services and is progressing towards goals. Mod A for bed mobility>EOB. CGA once standing using RW. Pt concerned for urinary incontinence and loose BM this afternoon.  NO LOB with gait training within room with multiple turns. Good maintenance of back precautions with activity. will continue to progress with stair training next session    Current Level of Function Impacting Discharge (mobility/balance): up to Mod A     Other factors to consider for discharge:          PLAN :  Patient continues to benefit from skilled intervention to address the above impairments. Continue treatment per established plan of care. to address goals. Recommendation for discharge: (in order for the patient to meet his/her long term goals)  Physical therapy at least 2 days/week in the home AND ensure assist and/or supervision for safety with mobility    This discharge recommendation:  Has been made in collaboration with the attending provider and/or case management    IF patient discharges home will need the following DME: patient owns DME required for discharge       SUBJECTIVE:   Patient stated Christina Eduardo would like to stay in the room.     OBJECTIVE DATA SUMMARY:   Critical Behavior:  Neurologic State: Alert  Orientation Level: Oriented X4  Cognition: Follows commands  Safety/Judgement: Awareness of environment, Fall prevention, Insight into deficits    Spinal diagnosis intervention:  The patient stated 3/3 back precautions when prompted. Reviewed all 3 back precautions, log roll technique, and sitting for 30 minutes at a time. The patient required few cues to maintain back precautions during functional activity. Reviewed back brace application and wear schedule. Brace donned with minimal assistance/contact guard assist      Functional Mobility Training:    Bed Mobility:  Log    Supine to Sit: Moderate assistance; Assist x1  Sit to Supine: Moderate assistance; Assist x1           Transfers:  Sit to Stand: Moderate assistance; Assist x1; Additional time  Stand to Sit: Minimum assistance;  Additional time; Assist x1        Bed to Chair: Minimum assistance                    Balance:  Sitting: Intact  Standing: With support  Standing - Static: Constant support; Good  Standing - Dynamic : Fair  Ambulation/Gait Training:  Distance (ft): 60 Feet (ft)  Assistive Device: Walker, rolling; Gait belt; Brace/Splint  Ambulation - Level of Assistance: Assist x1; Additional time; Contact guard assistance        Gait Abnormalities: Decreased step clearance; Step to gait; Antalgic        Base of Support: Widened     Speed/Sabrina: Pace decreased (<100 feet/min)                       Stairs: Therapeutic Exercises:     Pain Ratin/10    Activity Tolerance:   Good    After treatment patient left in no apparent distress:   Supine in bed, Call bell within reach, and Bed / chair alarm activated    COMMUNICATION/COLLABORATION:   The patients plan of care was discussed with: Registered nurse. Taniya Hernandez   Time Calculation: 25 mins          2021 1036 by Dipesh Salinas  Outcome: Progressing Towards Goal  Note:   PHYSICAL THERAPY TREATMENT  Patient: Cole Gonzalez (53 y.o. female)  Date: 2021  Diagnosis: Lumbar spinal stenosis [M48.061]   <principal problem not specified>  Procedure(s) (LRB):  L2-5 LAMINECTOMY AND DECOMPRESSION, L2-5 POSTERIOR FUSION WITH IMAGE GUIDANCE, ALLOGRAFT (O-ARM) (N/A) 2 Days Post-Op  Precautions: Fall, Back (LSO brace when OOB) No bending, no lifting greater than 5 lbs, no twisting, log-roll technique, repositioning every 20-30 min except when sleeping, brace when OOB (if ordered)  Chart, physical therapy assessment, plan of care and goals were reviewed. ASSESSMENT  Patient continues with skilled PT services and is progressing towards goals. Increased need for assistance with bed mobility. Min A to don LSO. CGA/ Min A for gait training on level surfaces using RW. PT will continue to progress.      Current Level of Function Impacting Discharge (mobility/balance): Malik     Other factors to consider for discharge:          PLAN :  Patient continues to benefit from skilled intervention to address the above impairments. Continue treatment per established plan of care. to address goals. Recommendation for discharge: (in order for the patient to meet his/her long term goals)  Physical therapy at least 2 days/week in the home AND ensure assist and/or supervision for safety with mobility    This discharge recommendation:  Has been made in collaboration with the attending provider and/or case management    IF patient discharges home will need the following DME: patient owns DME required for discharge       SUBJECTIVE:   Patient stated the pain is there.     OBJECTIVE DATA SUMMARY:   Critical Behavior:  Neurologic State: Alert, Appropriate for age  Orientation Level: Oriented X4  Cognition: Follows commands  Safety/Judgement: Awareness of environment, Fall prevention, Insight into deficits    Spinal diagnosis intervention:  The patient stated 3/3 back precautions when prompted. Reviewed all 3 back precautions, log roll technique, and sitting for 30 minutes at a time. The patient required few cues to maintain back precautions during functional activity. Reviewed back brace application and wear schedule. Brace donned with minimal assistance/contact guard assist      Functional Mobility Training:    Bed Mobility:  Log    Supine to Sit: Moderate assistance; Assist x1; Additional time              Transfers:  Sit to Stand: Moderate assistance; Assist x1; Additional time  Stand to Sit: Minimum assistance        Bed to Chair: Minimum assistance                    Balance:  Sitting: Intact  Standing: With support  Standing - Static: Constant support; Good  Standing - Dynamic : Fair  Ambulation/Gait Training:  Distance (ft): 30 Feet (ft)  Assistive Device: Walker, rolling; Gait belt; Brace/Splint  Ambulation - Level of Assistance: Minimal assistance        Gait Abnormalities: Decreased step clearance;  Step to gait        Base of Support: Widened     Speed/Sabrina: Pace decreased (<100 feet/min)                       Stairs: Therapeutic Exercises:     Pain Ratin/10    Activity Tolerance:   Good    After treatment patient left in no apparent distress:   Sitting in chair, Call bell within reach, and Bed / chair alarm activated    COMMUNICATION/COLLABORATION:   The patients plan of care was discussed with: Registered nurse.      Starlette Number   Time Calculation: 29 mins

## 2021-11-18 NOTE — PROGRESS NOTES
11/18/2021  11:58 AM  Care Management Progress Note    RUR:  3%  Risk Level: [x]Low []Moderate []High  Value-based purchasing: [] Yes [x] No  Bundle patient: [] Yes [x] No   Specify:     Transition of care plan:  1. Discharge pending medical clearance. PT/OT treating. 2. Home with [place] with [facility]. 3. Outpatient follow-up. 4. Pt's family to transport. Care Management Interventions  PCP Verified by CM: Yes Rachana Ferris MD)  Mode of Transport at Discharge:  Other (see comment) (family)  Transition of Care Consult (CM Consult): [address]: No  Reason Outside [place]: Physician referred to specific agency  MyChart Signup: No  Discharge Durable Medical Equipment: No  Physical Therapy Consult: Yes  Occupational Therapy Consult: Yes  Speech Therapy Consult: No  Support Systems: Other Family Member(s), Child(ruthie)  Confirm Follow Up Transport: Family  Discharge Location  Discharge Placement: Home with home health

## 2021-11-18 NOTE — PROGRESS NOTES
ORTHOPAEDIC LUMBAR FUSION PROGRESS NOTE    NAME:     Dinorah Cordon   :       1950   MRN:       [id number]   DATE:      2021    POD:              2 Days Post-Op  S/P:              Procedure(s):  L2-5 LAMINECTOMY AND DECOMPRESSION, L2-5 POSTERIOR FUSION WITH IMAGE GUIDANCE, ALLOGRAFT (O-ARM)    SUBJECTIVE:    Reports improvement in preop  Leg pain and numbness. Feels left leg is stronger. Postop pain  adequately controlled. Tolerating PO intake- full liquids  Ambulation tolerance - walked inside  room, in the [address] with PT yesterday. Walking to the bathroom as needed  Passing flatus  +++, no BM yet  Voiding  Without difficulty, had to straight cath first time after removing wallace. Denies nausea/vomiting, headache, chest pain or shortness of breath  Recent Labs     21  0245 21  0600 21  0600   HGB 10.1*   < > 10.8*   NA  --   --  142   K  --   --  4.4   CL  --   --  109*   CO2  --   --  27   BUN  --   --  10   CREA  --   --  0.86   GLU  --   --  106*    < > = values in this interval not displayed. Patient Vitals for the past 12 hrs:   BP Temp Pulse Resp SpO2   21 0632 121/69 98.2 °F (36.8 °C) 88 17 93 %   21 2322 (!) 142/56 98.5 °F (36.9 °C) 95 17 96 %   21 113/69 98.4 °F (36.9 °C) 87 17 97 %     Exam:  Positive strength/ROM bilat lower ext., except,  Left Quad 3/5 and D/F 4+/5. Neuro intact to sensation,  Slightly diminished sensation in the feet. Abdomen soft, non tender, calves- non tender  Dressings clean and dry, PATRICE is functioning well  Hemovac: Holding suction, output 20 ml last shift  Lower extremities warm and well perfused      PLAN: 2 Days Post-Op, improved   Continue PO pain medications as needed  Continue SCD's,  - frequent ambulation,  Have to train stairs.   Diet:  Advance to regular diet   - Discontinue Hemovac  - Start lovenox tonight  Continue bowel regimen   Continue lumbar orthosis  Continue Vit D3  Medical comorbities stable - Vitals stable, Hb 10.1 this morning.   Discharge planning - tomorrow once patient clears PT.

## 2021-11-19 VITALS
OXYGEN SATURATION: 97 % | WEIGHT: 225.09 LBS | SYSTOLIC BLOOD PRESSURE: 126 MMHG | DIASTOLIC BLOOD PRESSURE: 59 MMHG | TEMPERATURE: 98.1 F | HEART RATE: 97 BPM | BODY MASS INDEX: 36.33 KG/M2 | RESPIRATION RATE: 18 BRPM

## 2021-11-19 PROCEDURE — 97535 SELF CARE MNGMENT TRAINING: CPT

## 2021-11-19 PROCEDURE — 94760 N-INVAS EAR/PLS OXIMETRY 1: CPT

## 2021-11-19 PROCEDURE — 77030038269 HC DRN EXT URIN PURWCK BARD -A

## 2021-11-19 PROCEDURE — 97116 GAIT TRAINING THERAPY: CPT

## 2021-11-19 PROCEDURE — 97530 THERAPEUTIC ACTIVITIES: CPT

## 2021-11-19 PROCEDURE — 74011250637 HC RX REV CODE- 250/637: Performed by: NURSE PRACTITIONER

## 2021-11-19 RX ORDER — CYCLOBENZAPRINE HCL 10 MG
10 TABLET ORAL
Qty: 60 TABLET | Refills: 0 | Status: SHIPPED | OUTPATIENT
Start: 2021-11-19

## 2021-11-19 RX ORDER — ACETAMINOPHEN 500 MG
1000 TABLET ORAL EVERY 6 HOURS
Qty: 120 TABLET | Refills: 1 | Status: SHIPPED | OUTPATIENT
Start: 2021-11-19

## 2021-11-19 RX ORDER — ENOXAPARIN SODIUM 100 MG/ML
40 INJECTION SUBCUTANEOUS EVERY 24 HOURS
Qty: 5.2 ML | Refills: 0 | Status: SHIPPED | OUTPATIENT
Start: 2021-11-19

## 2021-11-19 RX ORDER — OXYCODONE HYDROCHLORIDE 5 MG/1
5-10 TABLET ORAL
Qty: 56 TABLET | Refills: 0 | Status: SHIPPED | OUTPATIENT
Start: 2021-11-19 | End: 2021-11-22

## 2021-11-19 RX ADMIN — SPIRONOLACTONE 25 MG: 25 TABLET ORAL at 10:40

## 2021-11-19 RX ADMIN — Medication 10 ML: at 07:24

## 2021-11-19 RX ADMIN — ACETAMINOPHEN 1000 MG: 500 TABLET ORAL at 10:40

## 2021-11-19 RX ADMIN — Medication 10 ML: at 13:16

## 2021-11-19 RX ADMIN — DOCUSATE SODIUM 50MG AND SENNOSIDES 8.6MG 1 TABLET: 8.6; 5 TABLET, FILM COATED ORAL at 10:40

## 2021-11-19 RX ADMIN — MONTELUKAST 10 MG: 10 TABLET, FILM COATED ORAL at 10:40

## 2021-11-19 RX ADMIN — FAMOTIDINE 20 MG: 20 TABLET, FILM COATED ORAL at 10:40

## 2021-11-19 NOTE — PROGRESS NOTES
Problem: Mobility Impaired (Adult and Pediatric)  Goal: *Acute Goals and Plan of Care (Insert Text)  Description: FUNCTIONAL STATUS PRIOR TO ADMISSION: Patient was modified independent and active with use of RW. HOME SUPPORT PRIOR TO ADMISSION: The patient lived with her sister but did not require assistance. Physical Therapy Goals  Initiated 11/17/2021    1. Patient will move from supine to sit and sit to supine , scoot up and down, and roll side to side in bed with modified independence within 4 days. 2. Patient will perform sit to stand with modified independence within 4 days. 3. Patient will ambulate with modified independence for 150 feet with the least restrictive device within 4 days. 4. Patient will ascend/descend 9 stairs with 1 handrail(s) with supervision/set-up within 4 days. 5. Patient will verbalize and demonstrate understanding of spinal precautions (No bending, lifting greater than 5 lbs, or twisting; log-roll technique; frequent repositioning as instructed) within 4 days. Outcome: Progressing Towards Goal   PHYSICAL THERAPY TREATMENT  Patient: Marcelo Lea (34 y.o. female)  Date: 11/19/2021  Diagnosis: Lumbar spinal stenosis [M48.061]   <principal problem not specified>  Procedure(s) (LRB):  L2-5 LAMINECTOMY AND DECOMPRESSION, L2-5 POSTERIOR FUSION WITH IMAGE GUIDANCE, ALLOGRAFT (O-ARM) (N/A) 3 Days Post-Op  Precautions: Fall, Back (LSO brace when OOB) No bending, no lifting greater than 5 lbs, no twisting, log-roll technique, repositioning every 20-30 min except when sleeping, brace when OOB (if ordered)  Chart, physical therapy assessment, plan of care and goals were reviewed. ASSESSMENT  Patient continues with skilled PT services and is progressing towards goals. Extended size of her brace to increase velcro overlap. Patient requires min assist to guillermo brace.   Performed transfer training for rolling, supine to sit, and sit <> stand with min assist of one and cues and demonstration for best technique. Performed gait training with the rolling walker in the [place] with supervision of one. Returned to room and set patient up in bedside chair with chair alarm. Performed stair training with bilateral rails. Instructed patient that she will need her sister to carry the walker up for her - Patient denies nausea, dizziness, or increased pain with mobility. She is cleared from a mobility standpoint for discharge home with her sister assisting    Current Level of Function Impacting Discharge (mobility/balance): min assist with in/out of bed, she reports that her sister will help her at home    Other factors to consider for discharge: lives with sister, she states that her son who is a paramedic will also be available to help her, she has decreased knee flexion bilaterally - this is her baseline         PLAN :  Patient continues to benefit from skilled intervention to address the above impairments. Continue treatment per established plan of care. to address goals. Recommendation for discharge: (in order for the patient to meet his/her long term goals)  Physical therapy at least 2 days/week in the home     This discharge recommendation:  Has not yet been discussed the attending provider and/or case management    IF patient discharges home will need the following DME: patient owns DME required for discharge       SUBJECTIVE:   Patient stated Divina Guaman had knee surgery in April.     OBJECTIVE DATA SUMMARY:   Critical Behavior:  Neurologic State: Alert  Orientation Level: Oriented X4  Cognition: Follows commands, Appropriate decision making, Appropriate for age attention/concentration, Appropriate safety awareness  Safety/Judgement: Awareness of environment, Fall prevention, Insight into deficits    Spinal diagnosis intervention:  The patient required verbal cues to maintain back precautions during functional activity.      Functional Mobility Training:    Bed Mobility:  Log    Supine to Sit: Minimum assistance; Assist x1; Additional time; Adaptive equipment  Sit to Supine: Minimum assistance; Assist x1; Additional time; Adaptive equipment           Transfers:  Sit to Stand: Stand-by assistance; Assist x1  Stand to Sit: Stand-by assistance; Assist x1; Additional time                             Balance:  Sitting: Intact  Standing: With support  Ambulation/Gait Training:  Distance (ft): 80 Feet (ft) (twice)  Assistive Device: Walker, rolling; Gait belt  Ambulation - Level of Assistance: Contact guard assistance; Assist x1; Additional time        Gait Abnormalities: Decreased step clearance; Trunk sway increased; Circumduction; Hip Hike; Step to gait                    Stairs:  Number of Stairs Trained: 4  Stairs - Level of Assistance: Contact guard assistance; Assist X1; Additional time   Rail Use: Both    Pain Rating:  Patient reports \"I have some pain, it's not bad. \"  \"I already got medicine. \"    Activity Tolerance:   Good    After treatment patient left in no apparent distress:   Sitting in chair, Call bell within reach, and Bed / chair alarm activated    COMMUNICATION/COLLABORATION:   The patients plan of care was discussed with: Occupational therapy assistant and Registered nurse.      Render Kwame, PT   Time Calculation: 48 mins

## 2021-11-19 NOTE — DISCHARGE INSTRUCTIONS
Dg Tapia MD  [address] [place] [address]  Office Phone: [phone]  Lumbar Surgery Discharge Instructions    Activities   You are going home a well person, be as active as possible. Your only exercise should be walking. Start with short frequent walks and increase your walking distance each day. Start with walking three times per day for 5 minutes and increase your distance each day 2-3 minutes. Limit the amount of time you sit to 20-30 minute intervals. Sitting for prolonged periods of time will be uncomfortable for you following your surgery.  Do not lift anything over 20 pounds for 10-12 weeks, and do not do any bending or straining.  When you are in the bed, you may lay on your back or on either side. Do not lay on your stomach.  Continue using your incentive spirometer regularly for deep breathing exercises   You may resume sexual relations 6 weeks after your surgery      Diet   You may resume your normal diet. Be sure to drink plenty of fluids, it is important to keep yourself hydrated.  MAKE SURE YOU ARE GETTING GOOD NUTRITION (Lean Protein, Vitamin D AND Calcium)   Avoid alcoholic beverages and ABSOLUTELY NO tobacco products. Tobacco products will interfere with your healing. If you continue to use tobacco, you may end up needing another surgery in the future. Medications   Do not take anti-inflammatory medications or aspirin unless instructed by your physician. Resume aspirin 81 mg after you finish the Lovenox   Take your pain medication as directed.  Do NOT take additional Tylenol if your prescribed pain medication has acetaminophen in it (Endocet/Percocet, Lortab, Norco).  It is important to have regular bowel movements. Pain medications may cause constipation. Stool softeners, prune juice, and increasing your water and fiber intake may help in preventing constipation.  Do NOT take laxatives if at all possible except in severe situations.  It can results in a vicious cycle of constipation and diarrhea.  Do not be alarmed if you still have some of the same symptoms you had prior to surgery. The nerves often require time to heal after the pressure has been relieved. You may experience pain in your shoulders or between your shoulder blades, which is common after this surgery. The level of pain you experience should improve as your body heals. Driving   You may not drive or return to work until instructed by your physician. However, you may ride in the car for short periods of time. Brace   If you have a back brace, you should wear your brace at all times when you are out of bed. Do not wear the brace while in bed or showering.  Remember to always wear a cotton t-shirt underneath your brace.  Do not bend or twist when your brace is off. Showering   For now, you may shower the front side of your body. Approximately 5-7 days after your surgery, if your incision is not draining, you may begin taking full showers. Your dressing is waterproof.  Do not rub or apply lotion or ointments to the incision site.  Do not use tub baths, swimming pools or Jacuzzis. Caring for your incision   Keep the queta dressing on until 7 days after discharge. At that point, if the incision is dry and without drainage, you may keep the wound open to air without cover.  You may have steri-strips on your incision (small, white pieces of tape). Do not pull the steri-strips; they will fall off on their own after several days. If you have sutures or staples, they will be removed by home health or when you see your physician.  Do not rub or apply any lotions or ointments to your incision site. Follow Up  · You should already have your follow-up visit scheduled with Dr. Olena Washington for 2-3 weeks after surgery. Please call the office if there are any questions or concerns regarding your follow-up.        Notify your physician if you develop any of the following conditions:   Fever above 101 degrees for 24 hours.  Nausea or vomiting.  Severe headache.  Inability to urinate.  Loss of bowel or bladder control (sudden onset of incontinence).  Changes in sensation in your extremities (numbness, tingling, loss of color).  Severe pain or pain not relieved by medications.  Redness, swelling, or drainage from your incision.  Persistent pain in the chest.    Pain in the calf of either leg.  Increased weakness (if this is greater than before your surgery). If you have any questions, contact your Orthopaedic Surgeons office.

## 2021-11-19 NOTE — PROGRESS NOTES
Problem: Falls - Risk of  Goal: *Absence of Falls  Description: Document Alesia Scales Fall Risk and appropriate interventions in the flowsheet. Outcome: Progressing Towards Goal  Note: Fall Risk Interventions:  Mobility Interventions: Bed/chair exit alarm, OT consult for ADLs, PT Consult for mobility concerns, Utilize walker, cane, or other assistive device         Medication Interventions: Bed/chair exit alarm, Teach patient to arise slowly    Elimination Interventions: Bed/chair exit alarm, Call light in reach, Patient to call for help with toileting needs, Toileting schedule/hourly rounds              Problem: Patient Education: Go to Patient Education Activity  Goal: Patient/Family Education  Outcome: Progressing Towards Goal     Problem: Pressure Injury - Risk of  Goal: *Prevention of pressure injury  Description: Document Roland Scale and appropriate interventions in the flowsheet. Outcome: Progressing Towards Goal  Note: Pressure Injury Interventions:  Sensory Interventions: Discuss PT/OT consult with provider, Turn and reposition approx. every two hours (pillows and wedges if needed), Pressure redistribution bed/mattress (bed type), Assess changes in LOC, Float heels    Moisture Interventions: Internal/External urinary devices, Maintain skin hydration (lotion/cream), Absorbent underpads, Check for incontinence Q2 hours and as needed, Minimize layers, Offer toileting Q_hr    Activity Interventions: Increase time out of bed, Pressure redistribution bed/mattress(bed type), PT/OT evaluation    Mobility Interventions: HOB 30 degrees or less, Pressure redistribution bed/mattress (bed type), PT/OT evaluation, Turn and reposition approx.  every two hours(pillow and wedges)    Nutrition Interventions: Document food/fluid/supplement intake                     Problem: Patient Education: Go to Patient Education Activity  Goal: Patient/Family Education  Outcome: Progressing Towards Goal     Problem: Patient Education: Go to Patient Education Activity  Goal: Patient/Family Education  Outcome: Progressing Towards Goal     Problem: Patient Education: Go to Patient Education Activity  Goal: Patient/Family Education  Outcome: Progressing Towards Goal

## 2021-11-19 NOTE — PROGRESS NOTES
ORTHOPAEDIC LUMBAR FUSION PROGRESS NOTE    NAME:     Jose Kinney   :       1950   MRN:       [id number]   DATE:      2021    POD:              3 Days Post-Op  S/P:              Procedure(s):  L2-5 LAMINECTOMY AND DECOMPRESSION, L2-5 POSTERIOR FUSION WITH IMAGE GUIDANCE, ALLOGRAFT (O-ARM)    SUBJECTIVE:    Reports improvement in preop leg pain and numbness  Postop pain controlled  Well with medications  Tolerating PO intake  well  Ambulation tolerance - Walked inside the room yesterday   Passing flatus , Had BM yesterday  Voiding - reports some incontinences, which she had before, UA is normal  Denies nausea/vomiting, headache, chest pain or shortness of breath  Recent Labs     21  0245 21  0600 21  0600   HGB 10.1*   < > 10.8*   NA  --   --  142   K  --   --  4.4   CL  --   --  109*   CO2  --   --  27   BUN  --   --  10   CREA  --   --  0.86   GLU  --   --  106*    < > = values in this interval not displayed.      Patient Vitals for the past 12 hrs:   BP Temp Pulse Resp SpO2   21 0503 112/70 98.3 °F (36.8 °C) 90 18 95 %   21 0031 117/63 98.7 °F (37.1 °C) 100 16 93 %   21 (!) 114/55 99.2 °F (37.3 °C) 100 18 96 %     Exam:  Positive strength/ROM bilat lower ext., except, left Quad3/5  Neuro intact to sensation  Dressings clean and dry  Hemovac- Discontinued  Lower extremities warm and well perfused      PLAN: 3 Days Post-Op, improved   Continue PO pain medications as needed  Continue SCD's, frequent ambulation  Diet:  Regular  Continue bowel regimen   Continue lumbar orthosis  Continue Vit D3  Medical comorbities stable   Discharge planning - today Vs tomorrow once patient clear PT

## 2021-11-19 NOTE — PROGRESS NOTES
Problem: Falls - Risk of  Goal: *Absence of Falls  Description: Document Dara Paniagua Fall Risk and appropriate interventions in the flowsheet. Outcome: Resolved/Met     Problem: Pressure Injury - Risk of  Goal: *Prevention of pressure injury  Description: Document Roland Scale and appropriate interventions in the flowsheet.   Outcome: Resolved/Met

## 2021-11-19 NOTE — PROGRESS NOTES
11/19/2021   2:49 PM  Care Management Progress Note      ICD-10-CM ICD-9-CM    1. Spinal stenosis of lumbar region without neurogenic claudication  M48.061 724.02 oxyCODONE IR (ROXICODONE) 5 mg immediate release tablet       RUR:  3%  Risk Level: [x]Low []Moderate []High  Value-based purchasing: [] Yes [x] No  Bundle patient: [] Yes [x] No   Specify:     Transition of care plan:  1. Medically stable with discharge order  2. Home with home health provided by [facility]   3. Outpatient follow-up. 4. Pt's family to transport  5. No further CM needs identified    Care Management Interventions  PCP Verified by CM: Yes Petra Meza MD)  Mode of Transport at Discharge: Other (see comment) (family)  Transition of Care Consult (CM Consult): [address]: No  Reason Outside [place]: Physician referred to specific agency  MyChart Signup: No  Discharge Durable Medical Equipment: No  Physical Therapy Consult: Yes  Occupational Therapy Consult: Yes  Speech Therapy Consult: No  Support Systems: Other Family Member(s)  Confirm Follow Up Transport: Family  Discharge Location  Discharge Placement: Home with home health    12:13 PM  CM received v/m from [address] with [email or web address] requesting call back. [address] confirmed patient's insurance has authorized home health services. CM updated Daina that plan is for probable discharge tomorrow.     Srinivas Muro RN

## 2021-11-19 NOTE — PROGRESS NOTES
Problem: Self Care Deficits Care Plan (Adult)  Goal: *Acute Goals and Plan of Care (Insert Text)  Description: FUNCTIONAL STATUS PRIOR TO ADMISSION: Patient was modified independent using a rolling walker for functional mobility. Sister has been providing transportation. HOME SUPPORT: The patient lived with sister but did not require assist.    Occupational Therapy Goals  Initiated 11/17/2021    1. Patient will perform lower body dressing with supervision/set-up using adaptive equipment PRN within 7 days. 2.  Patient will perform toileting and toilet transfer with supervision/set-up using most appropriate DME within 7 days. 3.  Patient will groom standing at sink at supervision/set-up within 7 days. 4.  Patient will don/doff LSO back brace at modified independence within 7 days. 5.  Patient will verbalize/demonstrate 3/3 back precautions during ADL tasks without cues within 7 days. OCCUPATIONAL THERAPY TREATMENT  Patient: Daksha Richardson (85 y.o. female)  Date: 11/19/2021  Diagnosis: Lumbar spinal stenosis [M48.061]   <principal problem not specified>  Procedure(s) (LRB):  L2-5 LAMINECTOMY AND DECOMPRESSION, L2-5 POSTERIOR FUSION WITH IMAGE GUIDANCE, ALLOGRAFT (O-ARM) (N/A) 3 Days Post-Op  Precautions: Fall, Back (LSO brace when OOB)  Chart, occupational therapy assessment, plan of care, and goals were reviewed. ASSESSMENT  Patient continues with skilled OT services and is progressing towards goals. Pt ambulated to restroom and transfers with min assist, able to perform her own hygiene. Stand by assist to doff'/don gown. Pt stood at the sink to wash her hands. Min assist to don brief with L SARA. Pt clear from an OT perspective once medically clear. Current Level of Function Impacting Discharge (ADLs): Min assist LB dress and toileting    Other factors to consider for discharge:          PLAN :  Patient continues to benefit from skilled intervention to address the above impairments.   Continue treatment per established plan of care to address goals. Recommend with staff: out of bed to chair for ADl's, there ex, there act, meals    Recommend next OT session: cont towards goals    Recommendation for discharge: (in order for the patient to meet his/her long term goals)  Occupational therapy at least 2 days/week in the home     This discharge recommendation:  Has not yet been discussed the attending provider and/or case management    IF patient discharges home will need the following DME:        SUBJECTIVE:   Patient stated Can I wear a brief?     OBJECTIVE DATA SUMMARY:   Cognitive/Behavioral Status:  Neurologic State: Alert  Orientation Level: Oriented X4  Cognition: Follows commands; Appropriate decision making; Appropriate for age attention/concentration; Appropriate safety awareness             Functional Mobility and Transfers for ADLs:  Bed Mobility:   Min assist supine to sit    Transfers:     Functional Transfers  Toilet Transfer : Minimum assistance  Adaptive Equipment: Grab bars       Balance:  Sitting: Intact  Standing: With support    ADL Intervention:       Grooming  Grooming Assistance: Stand-by assistance  Position Performed: Standing  Washing Hands: Stand-by assistance                   Lower Body Dressing Assistance  Dressing Assistance: Minimum assistance  Protective Undergarmet: Minimum assistance  Leg Crossed Method Used: No  Position Performed: Seated edge of bed  Adaptive Equipment Used: Reacher    Toileting  Toileting Assistance: Minimum assistance         Activity Tolerance:   Fair    After treatment patient left in no apparent distress:   Sitting in chair    COMMUNICATION/COLLABORATION:   The patients plan of care was discussed with: Physical therapist, Occupational therapist, and Registered nurse.      ISABELLA Retana  Time Calculation: 23 mins

## 2021-11-24 NOTE — DISCHARGE SUMMARY
Orthopedic Service Discharge Summary    Patient ID:  Joi Nielsen  [id number]  female  70 y.o.  1950    Admit date: 11/16/2021    Discharge date and time: 11/19/2021  5:46 PM     Admitting Physician: Ronaldo Knott MD     Discharge Physician: Ronaldo Knott MD    Consulting Physician(s): Treatment Team: Care Manager: Jorge Terry; Utilization Review: Kyree Koroma RN; Primary Nurse: Colton Manuel RN    Date of Surgery: 11/16/2021     Preoperative Diagnosis:  LEFT LEG WEAKNESS, LUMBAR RADICULOPATHY, SPINAL STENOSIS OF LUMBAR REGION WITH NEUROGENIC CLAUDICATION, SPONDYLOLISTHESIS OF LUMBAR REGION    Postoperative Diagnosis: LEFT LEG WEAKNESS, LUMBAR RADICULOPATHY, SPINAL STENOSIS OF LUMBAR REGION WITH NEUROGENIC CLAUDICATION, SPONDYLOLISTHESIS OF LUMBAR REGION    Procedure(s): Procedure(s):  L2-5 LAMINECTOMY AND DECOMPRESSION, L2-5 POSTERIOR FUSION WITH IMAGE GUIDANCE, ALLOGRAFT (O-ARM)    Surgeon: Surgeon(s) and Role:     * Martina Meek MD - Primary      Anesthesia:  General    Preoperative Medical Clearance:                           HPI:  Pt is a 70 y.o. female who has a history of Lumbar spinal stenosis [M48.061]  with pain and limitations of activities of daily living who presents at this time for Low back pain and BLE pain,  axial pain 6, radiating pain 6-7, left greater than right  Numbness -both legs  Weakness- both legs, she has 2/5 left quad, 3/5 DF on left  following the failure of conservative management. PMH:   Past Medical History:   Diagnosis Date    Asthma     Cervical cancer ([facility]     Radiation    Glaucoma     Hypertension     Low vitamin D level 11/02/2021    Sarcoidosis, lung ([facility]        Medications upon admission :   Prior to Admission Medications   Prescriptions Last Dose Informant Patient Reported? Taking? albuterol (PROAIR HFA) 90 mcg/actuation inhaler 11/16/2021 at Unknown time  Yes Yes   Sig: Take 2 Puffs by inhalation as needed for Wheezing.    aspirin 81 mg chewable tablet 11/9/2021  Yes No   Sig: Take 81 mg by mouth daily. cholecalciferol (Vitamin D3) (5000 Units/125 mcg) tab tablet Not Taking at Unknown time  No No   Sig: Take 2 Tablets by mouth daily for 30 days. Indications: low vitamin D levels   Patient not taking: Reported on 11/16/2021   elderberry fruit (ELDERBERRY PO) 11/9/2021  Yes No   Sig: Take 1 Tablet by mouth daily. fluticasone propion-salmeteroL (Wixela Inhub) 100-50 mcg/dose diskus inhaler 11/16/2021 at Unknown time  Yes Yes   Sig: Take 2 Puffs by inhalation every twelve (12) hours. latanoprost (XALATAN) 0.005 % ophthalmic solution 11/16/2021 at Unknown time  Yes Yes   Sig: Administer 1 Drop to both eyes nightly. lisinopril (PRINIVIL, ZESTRIL) 10 mg tablet 11/15/2021 at Unknown time  Yes Yes   Sig: Take 10 mg by mouth nightly. montelukast (SINGULAIR) 10 mg tablet 11/16/2021 at Unknown time  Yes Yes   Sig: Take 10 mg by mouth daily. pravastatin (PRAVACHOL) 10 mg tablet 11/15/2021 at Unknown time  Yes Yes   Sig: Take  by mouth nightly. spironolactone (ALDACTONE) 25 mg tablet 11/15/2021 at Unknown time  Yes Yes   Sig: Take 25 mg by mouth daily. Facility-Administered Medications: None        Allergies:  No Known Allergies     Hospital Course: The patient underwent surgery. Complications:  None; patient tolerated the procedure well. Was taken to the PACU in stable condition and then transferred to the Orthopedics floor. Perioperative Antibiotics:  Ancef     Postoperative Pain Management:  Acetaminophen and Oxycodone   DVT Prophylaxis: LMW heparin     Postoperative transfusions:     none Banked PRBCs     Post Op complications: none    Hemoglobin at discharge:    Lab Results   Component Value Date/Time    HGB 10.1 (L) 11/18/2021 02:45 AM    INR 1.0 11/02/2021 02:46 PM       POD # 1. Incision - clean, dry and intact. No significant erythema or swelling. Neurovascular exam within normal limits except, left df 4/5, left quad 3/5. Wound appears to be healing without any evidence of infection. POD #2. Incision Clean/ Dry. No significant erythema or swelling. Neurovascular exam within normal limits except, left df 4/5, left quad 3/5. Wound appears to be healing without any evidence of infection. Pre op leg pain is improved. Pt had a HVAC drain the was removed on day 2. Prophylactic Lovenox started on POD #2. Keshia Dye POD #3   Incision - clean, dry and intact. No significant erythema or swelling. Neurovascular exam within normal limits except, left df 4/5, left quad 3/5. Wound appears to be healing without any evidence of infection. Pre op leg pain remains improved, Post op pain is adequately controlled. Physical Therapy started on the day following surgery and progressed to ambulation with the aid of a rolling walker for distances of 80 feet with  Contact guard assistance. Range of motion  limited by pain. At the time of discharge, pre op leg pain improved, Neuro intact, except left Quad 3/5, DF 4+/5, post op pain is adequately controlled, able to go up and down stairs and had understanding of precautions needed following surgery. Discharged to: Home. Discharge instructions:  -See Full Summary of discharge instructions attached  -Anticoagulate with LMW heparin  -Resume pre hospital diet            -Resume home medications   Discharge Medication List as of 11/19/2021  3:52 PM      START taking these medications    Details   acetaminophen (TYLENOL) 500 mg tablet Take 2 Tablets by mouth every six (6) hours. , Normal, Disp-120 Tablet, R-1      cyclobenzaprine (FLEXERIL) 10 mg tablet Take 1 Tablet by mouth two (2) times daily as needed for Muscle Spasm(s). , Normal, Disp-60 Tablet, R-0      enoxaparin (LOVENOX) 40 mg/0.4 mL 0.4 mL by SubCUTAneous route every twenty-four (24) hours. , Normal, Disp-5.2 mL, R-0      oxyCODONE IR (ROXICODONE) 5 mg immediate release tablet Take 1-2 Tablets by mouth every six (6) hours as needed for Pain for up to 3 days. Max Daily Amount: 40 mg., Normal, Disp-56 Tablet, R-0         CONTINUE these medications which have NOT CHANGED    Details   cholecalciferol (Vitamin D3) (5000 Units/125 mcg) tab tablet Take 2 Tablets by mouth daily for 30 days. Indications: low vitamin D levels, Normal, Disp-60 Tablet, R-0      elderberry fruit (ELDERBERRY PO) Take 1 Tablet by mouth daily. , Historical Med      fluticasone propion-salmeteroL (Wixela Inhub) 100-50 mcg/dose diskus inhaler Take 2 Puffs by inhalation every twelve (12) hours. , Historical Med      montelukast (SINGULAIR) 10 mg tablet Take 10 mg by mouth daily. , Historical Med      albuterol (PROAIR HFA) 90 mcg/actuation inhaler Take 2 Puffs by inhalation as needed for Wheezing., Historical Med      spironolactone (ALDACTONE) 25 mg tablet Take 25 mg by mouth daily. , Historical Med      pravastatin (PRAVACHOL) 10 mg tablet Take  by mouth nightly., Historical Med      latanoprost (XALATAN) 0.005 % ophthalmic solution Administer 1 Drop to both eyes nightly., Historical Med      lisinopril (PRINIVIL, ZESTRIL) 10 mg tablet Take 10 mg by mouth nightly., Historical Med         STOP taking these medications       aspirin 81 mg chewable tablet Comments:   Reason for Stopping:            per medical continuation form  -Discharge activity: Activity as tolerated, Ambulate in house, No driving while on analgesics, PT/OT per Home Health and See surgical instructions  -Ambulate with Walkers, Type: Rolling Walker,   -Wound Care Keep wound clean and dry, Reinforce dressing PRN, Ice to area for comfort and As directed  -Follow up in office in 3 weeks      Signed:  Judy Ascencio NP  11/24/2021  10:20 AM        No att. providers found

## 2022-03-19 PROBLEM — M48.061 LUMBAR SPINAL STENOSIS: Status: ACTIVE | Noted: 2021-11-16

## 2023-10-04 LAB
ESTIMATED AVERAGE GLUCOSE: NORMAL
HBA1C MFR BLD: 6.1 %

## 2024-01-03 ENCOUNTER — HOME HEALTH ADMISSION (OUTPATIENT)
Dept: HOME HEALTH SERVICES | Facility: HOME HEALTH | Age: 74
End: 2024-01-03
Payer: MEDICARE

## 2024-01-15 ENCOUNTER — HOME CARE VISIT (OUTPATIENT)
Facility: HOME HEALTH | Age: 74
End: 2024-01-15
Payer: MEDICARE

## 2024-01-15 VITALS
HEART RATE: 76 BPM | DIASTOLIC BLOOD PRESSURE: 60 MMHG | SYSTOLIC BLOOD PRESSURE: 105 MMHG | OXYGEN SATURATION: 96 % | TEMPERATURE: 98.5 F | RESPIRATION RATE: 16 BRPM

## 2024-01-15 PROCEDURE — G0152 HHCP-SERV OF OT,EA 15 MIN: HCPCS

## 2024-01-15 PROCEDURE — G0151 HHCP-SERV OF PT,EA 15 MIN: HCPCS

## 2024-01-15 ASSESSMENT — ENCOUNTER SYMPTOMS: DYSPNEA ACTIVITY LEVEL: AFTER AMBULATING 10 - 20 FT

## 2024-01-16 VITALS
OXYGEN SATURATION: 97 % | HEART RATE: 78 BPM | DIASTOLIC BLOOD PRESSURE: 60 MMHG | SYSTOLIC BLOOD PRESSURE: 105 MMHG | RESPIRATION RATE: 18 BRPM | TEMPERATURE: 98.5 F

## 2024-01-16 NOTE — HOME HEALTH
booklet.    Interdisciplinary communication with: HA Oscar for the purpose of POC collaboration    Discharge planning as follows: Will discharge when the patient has reached their maximum functional potential and maximum safety in their home and When goals are met    Specific plan for next visit: Re-instruct patient/caregiver on ADL training, safety, transfers.   .  Modified Barthel:   Feeding 10  Bathing 0   Grooming  5  Dressing 0 5  Bowels 10  Bladder 0   Toilet Use 0   Transfer 0 5  Mobility 0   Stairs 0   35/100  .
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
## 2024-01-17 ENCOUNTER — HOME CARE VISIT (OUTPATIENT)
Facility: HOME HEALTH | Age: 74
End: 2024-01-17
Payer: MEDICARE

## 2024-01-17 VITALS
DIASTOLIC BLOOD PRESSURE: 64 MMHG | OXYGEN SATURATION: 98 % | SYSTOLIC BLOOD PRESSURE: 116 MMHG | TEMPERATURE: 98.1 F | HEART RATE: 92 BPM

## 2024-01-17 PROCEDURE — G0158 HHC OT ASSISTANT EA 15: HCPCS

## 2024-01-17 NOTE — HOME HEALTH
Subjective: Id like to get back into doing my things in the bathroom  Falls since last visit (if yes complete the Fall Tracking Form and include bsrifallreport): No   Caregiver involvement changes: No   Home health supplies by type and quantity ordered/delivered this visit include: No   Clinician asked if patient has had any physician contact since last home care visit and patient states: no   Clinician asked if patient has any new or changed medications and patient states: No   If Yes, were medications reconciled? N/a   Was the certifying physician notified of changes in medications? n/a   Clinical assessment (what this visit means for the patient overall and need for ongoing skilled care) and progress or lack of progress towards SPECIFIC goals: Pt slowly progressing towards ADL goals but impaired balance, strength, and WB status in limiting pt indepence and safety and will continue to require OT services to addresss these concerns, establish UB HEP and address safe meal prep training. Progress towards strengthening goal with established HEP, but no goals met and will continue to require OT services to address these concerns  Written Teaching Material Utilized: N/A   Interdisciplinary communication with: no   Discharge planning as follows: Per physician order, Will discharge when the patient has reached their maximum functional potential and maximum safety in their home and When goals are met   Specific plan for next visit: continue with ADL retraining

## 2024-01-18 ENCOUNTER — HOME CARE VISIT (OUTPATIENT)
Facility: HOME HEALTH | Age: 74
End: 2024-01-18
Payer: MEDICARE

## 2024-01-18 VITALS
RESPIRATION RATE: 16 BRPM | OXYGEN SATURATION: 98 % | HEART RATE: 79 BPM | TEMPERATURE: 97 F | DIASTOLIC BLOOD PRESSURE: 62 MMHG | SYSTOLIC BLOOD PRESSURE: 110 MMHG

## 2024-01-18 PROCEDURE — G0151 HHCP-SERV OF PT,EA 15 MIN: HCPCS

## 2024-01-18 NOTE — HOME HEALTH
Subjective: patient states [facility] NP was there this morning, prescribed some meds but she doesn't have them yet, pertaining to bone density  Falls since last visit No(if yes complete the Fall Tracking Form and include bsrifallreport):   Caregiver involvement changes: none  Home health supplies by type and quantity ordered/delivered this visit include: n/a    Clinician asked if patient has had any physician contact since last home care visit and patient states: YES  Clinician asked if patient has any new or changed medications and patient states:  YES see above  If Yes, were medications reconciled? N/A   Was the certifying physician notified of changes in medications? N/A     Clinical assessment (what this visit means for the patient overall and need for ongoing skilled care) and progress or lack of progress towards SPECIFIC goals: patient made good progress today toward HEP and transfer goals. due to profound B LE weakness she requires much more strengthening and mobility training to meet the goals and progress back to being able to use L prosthesis when she is upgraded to WBAT.    Written Teaching Material Utilized: HEP    Interdisciplinary communication with: N/A     Discharge planning as follows: When goals are met    Specific plan for next visit: strengthening, transfers

## 2024-01-22 ENCOUNTER — HOME CARE VISIT (OUTPATIENT)
Facility: HOME HEALTH | Age: 74
End: 2024-01-22
Payer: MEDICARE

## 2024-01-22 VITALS
TEMPERATURE: 97.3 F | HEART RATE: 63 BPM | SYSTOLIC BLOOD PRESSURE: 110 MMHG | OXYGEN SATURATION: 97 % | DIASTOLIC BLOOD PRESSURE: 68 MMHG

## 2024-01-22 PROCEDURE — G0158 HHC OT ASSISTANT EA 15: HCPCS

## 2024-01-22 NOTE — HOME HEALTH
Subjective: pt has bloody nose uon arrival  Falls since last visit (if yes complete the Fall Tracking Form and include bsrifallreport): No   Caregiver involvement changes: No   Home health supplies by type and quantity ordered/delivered this visit include: No   Clinician asked if patient has had any physician contact since last home care visit and patient states: no  Clinician asked if patient has any new or changed medications and patient states: alendronate sodium tablet 70 mg  by [place] by 1x week in the morning starting tomorrow.  If Yes, were medications reconciled? yes  Was the certifying physician notified of changes in medications? yes  Clinical assessment (what this visit means for the patient overall and need for ongoing skilled care) and progress or lack of progress towards SPECIFIC goals: Patient is progressing towards IADLs goal of simple meal prep with ability to access items from wheelchair level and complete item transport to microwave using toaster or coffee. Patient is mostly cooking a lot of things on stove top and would like to get back to stove top meals and will continue to benefit from skilled OT services to address, task, modifications, and safety with this technique along with balance for ADL routines for donning LB garments around hips and standing position. Patient is now transitioned to going to the bathroom during the daytime and only using BSC at night time, reducing caregiver dependence of cleaning after toileting routine.  Written Teaching Material Utilized: N/A   Interdisciplinary communication with: no  Discharge planning as follows: Per physician order, Will discharge when the patient has reached their maximum functional potential and maximum safety in their home and When goals are met   Specific plan for next visit: standing balance for c/m routine

## 2024-01-23 ENCOUNTER — HOME CARE VISIT (OUTPATIENT)
Facility: HOME HEALTH | Age: 74
End: 2024-01-23
Payer: MEDICARE

## 2024-01-23 VITALS
OXYGEN SATURATION: 95 % | HEART RATE: 83 BPM | DIASTOLIC BLOOD PRESSURE: 60 MMHG | RESPIRATION RATE: 16 BRPM | SYSTOLIC BLOOD PRESSURE: 100 MMHG | TEMPERATURE: 97 F

## 2024-01-23 PROCEDURE — G0151 HHCP-SERV OF PT,EA 15 MIN: HCPCS

## 2024-01-23 PROCEDURE — G0299 HHS/HOSPICE OF RN EA 15 MIN: HCPCS

## 2024-01-23 NOTE — HOME HEALTH
Subjective: no new problems  Falls since last visit No(if yes complete the Fall Tracking Form and include bsrifallreport):   Caregiver involvement changes: none  Home health supplies by type and quantity ordered/delivered this visit include: na    Clinician asked if patient has had any physician contact since last home care visit and patient states: NO  Clinician asked if patient has any new or changed medications and patient states:  NO   If Yes, were medications reconciled? N/A   Was the certifying physician notified of changes in medications? N/A     Clinical assessment (what this visit means for the patient overall and need for ongoing skilled care) and progress or lack of progress towards SPECIFIC goals: patient is making slow progress toward all goals but remains significantly limited by weakness and NWBing on the L LE which is her BKA side. she sees the orthopedist at the end of the week. skilled PT remains necessary to meet all goals as stated.    Written Teaching Material Utilized: HEP    Interdisciplinary communication with: N/A     Discharge planning as follows: When goals are met    Specific plan for next visit: strengthening, standing, transfers

## 2024-01-24 ENCOUNTER — HOME CARE VISIT (OUTPATIENT)
Facility: HOME HEALTH | Age: 74
End: 2024-01-24
Payer: MEDICARE

## 2024-01-24 VITALS
SYSTOLIC BLOOD PRESSURE: 100 MMHG | TEMPERATURE: 97 F | HEART RATE: 83 BPM | DIASTOLIC BLOOD PRESSURE: 60 MMHG | RESPIRATION RATE: 18 BRPM | OXYGEN SATURATION: 95 %

## 2024-01-24 VITALS
SYSTOLIC BLOOD PRESSURE: 104 MMHG | DIASTOLIC BLOOD PRESSURE: 62 MMHG | TEMPERATURE: 98.4 F | OXYGEN SATURATION: 98 % | HEART RATE: 71 BPM

## 2024-01-24 PROCEDURE — G0158 HHC OT ASSISTANT EA 15: HCPCS

## 2024-01-24 ASSESSMENT — ENCOUNTER SYMPTOMS: STOOL DESCRIPTION: FORMED

## 2024-01-24 NOTE — HOME HEALTH
Subjective: i tried to do my pants this morning myself but I know I wasnt safe to do it alone  Falls since last visit (if yes complete the Fall Tracking Form and include bsrifallreport): No   Caregiver involvement changes: No   Home health supplies by type and quantity ordered/delivered this visit include: No   Clinician asked if patient has had any physician contact since last home care visit and patient states: No   Clinician asked if patient has any new or changed medications and patient states: No   If Yes, were medications reconciled? N/a   Was the certifying physician notified of changes in medications? n/a   Clinical assessment (what this visit means for the patient overall and need for ongoing skilled care) and progress or lack of progress towards SPECIFIC goals: Patient making good progress towards toileting and dressing goal with increasing standing tolerance through one-handed techniques for donning and doffing LB garments, but continues to require assistance due to inconsistency of balance. Increasing standing tolerance to approximately two minutes before fatigue. Patient has been compliant with UVHFP, adding to new exercises in today's HEP poor progression, and will continue to benefit from skilled OT services to address these concerns  Written Teaching Material Utilized: N/A   Interdisciplinary communication with: no   Discharge planning as follows: Per physician order, Will discharge when the patient has reached their maximum functional potential and maximum safety in their home and When goals are met   Specific plan for next visit: ADL transfer training

## 2024-01-25 ENCOUNTER — HOME CARE VISIT (OUTPATIENT)
Facility: HOME HEALTH | Age: 74
End: 2024-01-25
Payer: MEDICARE

## 2024-01-25 VITALS
TEMPERATURE: 97.2 F | HEART RATE: 80 BPM | SYSTOLIC BLOOD PRESSURE: 100 MMHG | RESPIRATION RATE: 16 BRPM | DIASTOLIC BLOOD PRESSURE: 60 MMHG | OXYGEN SATURATION: 98 %

## 2024-01-25 PROCEDURE — G0151 HHCP-SERV OF PT,EA 15 MIN: HCPCS

## 2024-01-25 NOTE — HOME HEALTH
Subjective: no new problems or complaints, states she does her HEP 1x/day  Falls since last visit No(if yes complete the Fall Tracking Form and include bsrifallreport):   Caregiver involvement changes: none  Home health supplies by type and quantity ordered/delivered this visit include: n/a    Clinician asked if patient has had any physician contact since last home care visit and patient states: NO  Clinician asked if patient has any new or changed medications and patient states:  NO   If Yes, were medications reconciled? N/A   Was the certifying physician notified of changes in medications? N/A     Clinical assessment (what this visit means for the patient overall and need for ongoing skilled care) and progress or lack of progress towards SPECIFIC goals: patient is making gradual progress toward strength, transfer, HEP goals. she has not been doing HEP as often as prescribed and was strongly encouraged to focus more on strengthening so she is ready and prepared for when orthopedist says she can WB on the L LE. she sees him tomorrow for f/u visit. due to significant weakness and WBing restrictions the patient requires further skilled PT to meet the stated goals    Written Teaching Material Utilized: HEP    Interdisciplinary communication with: N/A     Discharge planning as follows: When goals are met    Specific plan for next visit: strengthening, standing, transfers

## 2024-01-25 NOTE — HOME HEALTH
Subjective: patient states she is doing well  Falls since last visit No(if yes complete the Fall Tracking Form and include bsrifallreport):   Caregiver involvement changes: N/a  Home health supplies by type and quantity ordered/delivered this visit include: N/A    Clinician asked if patient has had any physician contact since last home care visit and patient states: NO  Clinician asked if patient has any new or changed medications and patient states:  N/A   If Yes, were medications reconciled? N/A   Was the certifying physician notified of changes in medications? N/A no    Clinical assessment (what this visit means for the patient overall and need for ongoing skilled care) and progress or lack of progress towards SPECIFIC goals: Patient is 73 year old female with previos fall resulting in fx of femur and hx of amputation living in split level home with family as caregivers often.   Medication education done this visit includes plavix.  Education given to patient on labs.   Patient response to visit includes understanding of need to discharge at next visit.   Needs continued SN to prevent infection and rehospitalization due to illness/diagnosis.   Needs continued SN for education.         Written Teaching Material Utilized: N/A    Interdisciplinary communication with: N/A    Discharge planning as follows: When goals are met    Specific plan for next visit: Discharge

## 2024-01-29 ENCOUNTER — HOME CARE VISIT (OUTPATIENT)
Facility: HOME HEALTH | Age: 74
End: 2024-01-29
Payer: MEDICARE

## 2024-01-29 VITALS
OXYGEN SATURATION: 97 % | HEART RATE: 79 BPM | DIASTOLIC BLOOD PRESSURE: 60 MMHG | TEMPERATURE: 98.1 F | SYSTOLIC BLOOD PRESSURE: 100 MMHG

## 2024-01-29 PROCEDURE — G0158 HHC OT ASSISTANT EA 15: HCPCS

## 2024-01-29 NOTE — HOME HEALTH
Subjective: I got good news at the doctor but still no weight on my leg until i go back feb 23  Falls since last visit (if yes complete the Fall Tracking Form and include bsrifallreport): No   Caregiver involvement changes: No   Home health supplies by type and quantity ordered/delivered this visit include: No   Clinician asked if patient has had any physician contact since last home care visit and patient states: No   Clinician asked if patient has any new or changed medications and patient states: No   If Yes, were medications reconciled? N/a   Was the certifying physician notified of changes in medications? n/a   Clinical assessment (what this visit means for the patient overall and need for ongoing skilled care) and progress or lack of progress towards SPECIFIC goals: Pt progressed with completion of simple meal prep of all cold or microwave use with mod I. Pt reports not feeling ready to use stove top until able to stand with prothesis on. Progression towards strength goal; goal not met yet. Progress with ADL safety goal of c/m during toilet tasks but will benefit from furhter balance training to ensure consistancy of balance during c/m for dressing or toilet.   Written Teaching Material Utilized: N/A   Interdisciplinary communication with: no   Discharge planning as follows: Per physician order, Will discharge when the patient has reached their maximum functional potential and maximum safety in their home and When goals are met   Specific plan for next visit: follow up with ADL training, Meal prep

## 2024-01-30 ENCOUNTER — HOME CARE VISIT (OUTPATIENT)
Facility: HOME HEALTH | Age: 74
End: 2024-01-30
Payer: MEDICARE

## 2024-01-30 VITALS
SYSTOLIC BLOOD PRESSURE: 104 MMHG | HEART RATE: 76 BPM | DIASTOLIC BLOOD PRESSURE: 60 MMHG | TEMPERATURE: 97.3 F | RESPIRATION RATE: 16 BRPM

## 2024-01-30 PROCEDURE — G0151 HHCP-SERV OF PT,EA 15 MIN: HCPCS

## 2024-01-30 NOTE — HOME HEALTH
Subjective: patient reports she will be NWBing for at least one more month. she sees orthopedist again on 2/23/24  Falls since last visit No(if yes complete the Fall Tracking Form and include bsrifallreport):   Caregiver involvement changes: none  Home health supplies by type and quantity ordered/delivered this visit include: n/a    Clinician asked if patient has had any physician contact since last home care visit and patient states: YES  Clinician asked if patient has any new or changed medications and patient states:  NO   If Yes, were medications reconciled? N/A   Was the certifying physician notified of changes in medications? N/A     Clinical assessment (what this visit means for the patient overall and need for ongoing skilled care) and progress or lack of progress towards SPECIFIC goals: patient continues to make good progress toward standing, transfers and HEP goals. she saw ortho and must be NWBing on the L LE for one more month and perhaps longer as that will be the 10 week juan. she will reach a plateau by the next visit and will be d/c'd and will need to continue her daily HEP and then restart PT once she gets the clearance to WBAT on the L LE with prosthesis.    Written Teaching Material Utilized: HEP    Interdisciplinary communication with: RN, TERRY, OT for the purpose of POC collaboration    Discharge planning as follows: Will discharge when the patient has reached their maximum functional potential and maximum safety in their home    Specific plan for next visit: review/instruct HEP

## 2024-01-31 ENCOUNTER — HOME CARE VISIT (OUTPATIENT)
Facility: HOME HEALTH | Age: 74
End: 2024-01-31
Payer: MEDICARE

## 2024-01-31 VITALS
HEART RATE: 78 BPM | TEMPERATURE: 97.8 F | OXYGEN SATURATION: 99 % | DIASTOLIC BLOOD PRESSURE: 58 MMHG | SYSTOLIC BLOOD PRESSURE: 109 MMHG | RESPIRATION RATE: 18 BRPM

## 2024-01-31 VITALS
SYSTOLIC BLOOD PRESSURE: 102 MMHG | HEART RATE: 69 BPM | DIASTOLIC BLOOD PRESSURE: 60 MMHG | OXYGEN SATURATION: 98 % | TEMPERATURE: 98.1 F

## 2024-01-31 PROCEDURE — G0158 HHC OT ASSISTANT EA 15: HCPCS

## 2024-01-31 PROCEDURE — G0299 HHS/HOSPICE OF RN EA 15 MIN: HCPCS

## 2024-01-31 NOTE — HOME HEALTH
Subjective: i hear we will hold visits until i can bare weight  Falls since last visit (if yes complete the Fall Tracking Form and include bsrifallreport): No   Caregiver involvement changes: No   Home health supplies by type and quantity ordered/delivered this visit include: No   Clinician asked if patient has had any physician contact since last home care visit and patient states: No   Clinician asked if patient has any new or changed medications and patient states: No   If Yes, were medications reconciled? N/a   Was the certifying physician notified of changes in medications? n/a   Clinical assessment (what this visit means for the patient overall and need for ongoing skilled care) and progress or lack of progress towards SPECIFIC goals: Progress towards toileting goal and c/m safety at CGA due to impaired balance and need to stand without prothesis due to NWB status for next 4 weeks. Pt is increasing independence with daytime use of commode. Pt is reaching max potential with OT services due to limited weight bearing status and is ready for reassessment with agreeing to restart therapy once lifting resrictions.   Written Teaching Material Utilized: N/A   Interdisciplinary communication with: DANIELA Jefferson on pt progress  Discharge planning as follows: Per physician order, Will discharge when the patient has reached their maximum functional potential and maximum safety in their home and When goals are met   Specific plan for next visit: OT reassessment/discharge

## 2024-02-01 ENCOUNTER — HOME CARE VISIT (OUTPATIENT)
Facility: HOME HEALTH | Age: 74
End: 2024-02-01
Payer: MEDICARE

## 2024-02-01 VITALS
TEMPERATURE: 97.2 F | DIASTOLIC BLOOD PRESSURE: 60 MMHG | OXYGEN SATURATION: 97 % | SYSTOLIC BLOOD PRESSURE: 110 MMHG | RESPIRATION RATE: 16 BRPM | HEART RATE: 83 BPM

## 2024-02-01 PROCEDURE — G0151 HHCP-SERV OF PT,EA 15 MIN: HCPCS

## 2024-02-01 NOTE — HOME HEALTH
Subjective: no complaints, no new problems, SN d/c'd yesterday, son is visiting today  Falls since last visit No(if yes complete the Fall Tracking Form and include bsrifallreport):   Caregiver involvement changes: none    Clinician asked if patient has had any physician contact since last home care visit and patient states: NO  Clinician asked if patient has any new or changed medications and patient states:  YES   If Yes, were medications reconciled? YES   Was the certifying physician notified of changes in medications? N/A PCP ordered    A list of reconciled medications has been given to the patient/caregiver  and uploaded to media.      Clinical assessment (what this visit means for the patient overall and need for ongoing skilled care) and progress or lack of progress towards SPECIFIC goals: Ms. Sheehan completed 6 PT visits and has reached a plateau in function. She made excellent progress toward transfer, standing on R LE and HEP/strength goals. She saw her orthopedist last week and remains NWBing on the L LE for at least 4-6 more weeks. She will need more skilled PT when orthopedist allows her to wear/use her L prosthesis and WBAT as she was ambulatory with a RW and prosthesis prior to her fall/fx. NOMNC signed today as OT plans to d/c next week.    Discharge Instructions:    Written Teaching Material Utilized: HEP    Instructed patient/caregiver on the following: Take all medications exactly as prescribed by physician. Updated medication list present in the home at discharge and Keep all scheduled medical appointments    Call physician for: any new problems    The patient/caregiver expressed knowledge and understanding of Discharge Instructions      Interdisciplinary communication with: RN, OT for the purpose of POC collaboration

## 2024-02-02 ASSESSMENT — ENCOUNTER SYMPTOMS: STOOL DESCRIPTION: FORMED

## 2024-02-02 NOTE — HOME HEALTH
Subjective: Patient states she is doing well, but hasn't been able to get ahold of the NP who ordered her labs, and her MD for ortho wouldn't submit them either.  Falls since last visit No(if yes complete the Fall Tracking Form and include bsrifallreport):   Caregiver involvement changes: N/a  Home health supplies by type and quantity ordered/delivered this visit include: N/A    Clinician asked if patient has had any physician contact since last home care visit and patient states: NO  Clinician asked if patient has any new or changed medications and patient states:  N/A   If Yes, were medications reconciled? N/A   Was the certifying physician notified of changes in medications? N/A no    Clinical assessment (what this visit means for the patient overall and need for ongoing skilled care) and progress or lack of progress towards SPECIFIC goals: Patient has no skilled need for continued SN care. Unable to obtain lab draw due to veins. NP notified of need for SN discharge, and how to obtain further assistance from us. Patient willing to go to [facility] for lab draw, NP aware of this as well.   All questions/concerns addressed.    Written Teaching Material Utilized: N/A    Interdisciplinary communication with: N/A    Discharge planning as follows; discharged from SN

## 2024-02-05 ENCOUNTER — HOME CARE VISIT (OUTPATIENT)
Facility: HOME HEALTH | Age: 74
End: 2024-02-05
Payer: MEDICARE

## 2024-02-05 ENCOUNTER — HOME CARE VISIT (OUTPATIENT)
Dept: HOME HEALTH SERVICES | Facility: HOME HEALTH | Age: 74
End: 2024-02-05
Payer: MEDICARE

## 2024-02-05 PROCEDURE — G0152 HHCP-SERV OF OT,EA 15 MIN: HCPCS

## 2024-02-07 VITALS
SYSTOLIC BLOOD PRESSURE: 100 MMHG | RESPIRATION RATE: 16 BRPM | HEART RATE: 73 BPM | OXYGEN SATURATION: 98 % | DIASTOLIC BLOOD PRESSURE: 60 MMHG | TEMPERATURE: 98 F

## 2024-02-07 NOTE — HOME HEALTH
Subjective: I am doing so much better than when you were here before.   Falls since last visit No(if yes complete the Fall Tracking Form and include bsrifallreport):   Caregiver involvement changes: no changes.   Home health supplies by type and quantity ordered/delivered this visit include: NA    Clinician asked if patient has had any physician contact since last home care visit and patient states: NO  Clinician asked if patient has any new or changed medications and patient states:  NO   If Yes, were medications reconciled? N/A   Was the certifying physician notified of changes in medications? N/A     Clinical assessment (what this visit means for the patient overall and need for ongoing skilled care) and progress or lack of progress towards SPECIFIC goals: see d/c summary    Written Teaching Material Utilized: d/c summary    Interdisciplinary communication with: MARA Virgen for the purpose of POC collaboration    Discharge planning as follows: d/c this date.

## 2024-06-05 ENCOUNTER — OFFICE VISIT (OUTPATIENT)
Age: 74
End: 2024-06-05
Payer: MEDICARE

## 2024-06-05 VITALS
HEART RATE: 82 BPM | TEMPERATURE: 97.8 F | BODY MASS INDEX: 36.33 KG/M2 | SYSTOLIC BLOOD PRESSURE: 110 MMHG | HEIGHT: 66 IN | DIASTOLIC BLOOD PRESSURE: 68 MMHG | RESPIRATION RATE: 16 BRPM | OXYGEN SATURATION: 95 %

## 2024-06-05 DIAGNOSIS — R73.09 ELEVATED HEMOGLOBIN A1C: ICD-10-CM

## 2024-06-05 DIAGNOSIS — G54.6 PHANTOM LIMB PAIN (HCC): ICD-10-CM

## 2024-06-05 DIAGNOSIS — M81.0 AGE-RELATED OSTEOPOROSIS WITHOUT CURRENT PATHOLOGICAL FRACTURE: Primary | ICD-10-CM

## 2024-06-05 DIAGNOSIS — Z76.89 ENCOUNTER TO ESTABLISH CARE: ICD-10-CM

## 2024-06-05 DIAGNOSIS — G89.29 CHRONIC BILATERAL LOW BACK PAIN WITHOUT SCIATICA: ICD-10-CM

## 2024-06-05 DIAGNOSIS — M54.50 CHRONIC BILATERAL LOW BACK PAIN WITHOUT SCIATICA: ICD-10-CM

## 2024-06-05 DIAGNOSIS — Z91.81 AT HIGH RISK FOR FALLS: ICD-10-CM

## 2024-06-05 DIAGNOSIS — I10 ESSENTIAL HYPERTENSION: ICD-10-CM

## 2024-06-05 DIAGNOSIS — E55.9 HYPOVITAMINOSIS D: ICD-10-CM

## 2024-06-05 DIAGNOSIS — Z89.512 HX OF BKA, LEFT (HCC): ICD-10-CM

## 2024-06-05 PROCEDURE — 99204 OFFICE O/P NEW MOD 45 MIN: CPT | Performed by: INTERNAL MEDICINE

## 2024-06-05 PROCEDURE — 3078F DIAST BP <80 MM HG: CPT | Performed by: INTERNAL MEDICINE

## 2024-06-05 PROCEDURE — 1123F ACP DISCUSS/DSCN MKR DOCD: CPT | Performed by: INTERNAL MEDICINE

## 2024-06-05 PROCEDURE — 3074F SYST BP LT 130 MM HG: CPT | Performed by: INTERNAL MEDICINE

## 2024-06-05 RX ORDER — ALENDRONATE SODIUM 70 MG/1
70 TABLET ORAL
Qty: 12 TABLET | Refills: 1 | Status: SHIPPED | OUTPATIENT
Start: 2024-06-05

## 2024-06-05 RX ORDER — ASPIRIN 81 MG/1
81 TABLET ORAL DAILY
COMMUNITY

## 2024-06-05 RX ORDER — GABAPENTIN 400 MG/1
400 CAPSULE ORAL 3 TIMES DAILY
Qty: 270 CAPSULE | Refills: 1 | Status: SHIPPED | OUTPATIENT
Start: 2024-06-05 | End: 2024-12-02

## 2024-06-05 RX ORDER — POTASSIUM CHLORIDE 750 MG/1
10 TABLET, FILM COATED, EXTENDED RELEASE ORAL DAILY
COMMUNITY

## 2024-06-05 SDOH — ECONOMIC STABILITY: HOUSING INSECURITY
IN THE LAST 12 MONTHS, WAS THERE A TIME WHEN YOU DID NOT HAVE A STEADY PLACE TO SLEEP OR SLEPT IN A SHELTER (INCLUDING NOW)?: NO

## 2024-06-05 SDOH — ECONOMIC STABILITY: INCOME INSECURITY: HOW HARD IS IT FOR YOU TO PAY FOR THE VERY BASICS LIKE FOOD, HOUSING, MEDICAL CARE, AND HEATING?: NOT VERY HARD

## 2024-06-05 SDOH — ECONOMIC STABILITY: FOOD INSECURITY: WITHIN THE PAST 12 MONTHS, THE FOOD YOU BOUGHT JUST DIDN'T LAST AND YOU DIDN'T HAVE MONEY TO GET MORE.: NEVER TRUE

## 2024-06-05 SDOH — ECONOMIC STABILITY: FOOD INSECURITY: WITHIN THE PAST 12 MONTHS, YOU WORRIED THAT YOUR FOOD WOULD RUN OUT BEFORE YOU GOT MONEY TO BUY MORE.: NEVER TRUE

## 2024-06-05 ASSESSMENT — PATIENT HEALTH QUESTIONNAIRE - PHQ9
1. LITTLE INTEREST OR PLEASURE IN DOING THINGS: NOT AT ALL
SUM OF ALL RESPONSES TO PHQ QUESTIONS 1-9: 0
SUM OF ALL RESPONSES TO PHQ9 QUESTIONS 1 & 2: 0
SUM OF ALL RESPONSES TO PHQ QUESTIONS 1-9: 0
2. FEELING DOWN, DEPRESSED OR HOPELESS: NOT AT ALL

## 2024-06-05 NOTE — PROGRESS NOTES
Lynn Sheehan (: 1950) is a 73 y.o. female, new patient, here for evaluation of the following chief complaint(s):  Chief Complaint   Patient presents with    Established New Doctor       Assessment and Plan:      Diagnosis Orders   1. Age-related osteoporosis without current pathological fracture  alendronate (FOSAMAX) 70 MG tablet      2. Hx of BKA, left (HCC)        3. At high risk for falls        4. Chronic bilateral low back pain without sciatica  gabapentin (NEURONTIN) 400 MG capsule      5. Phantom limb pain (HCC)  gabapentin (NEURONTIN) 400 MG capsule      6. Essential hypertension  CBC with Auto Differential    Comprehensive Metabolic Panel    Lipid Panel    CK      7. Elevated hemoglobin A1c  Hemoglobin A1C      8. Hypovitaminosis D  Vitamin D 25 Hydroxy      9. Encounter to establish care            1:  Continue current medication.  Refill(s) and management reviewed.    2-3:  Updated prosthesis in process as reviewed below.    4-5:  Continue current medication.  Refill(s) and management reviewed.    6-8:  Lab monitoring reviewed.      Return in about 6 weeks (around 2024) for medication follow-up, Blood Pressure follow-up, --needs 30min visit.  lab results and schedule of future lab studies reviewed with patient  reviewed medications and side effects in detail    For additional documentation of information and/or recommendations discussed this visit, please see notes in instructions.    Plan and evaluation (above) reviewed with pt at visit  Patient voiced understanding of plan and provided with time to ask/review questions.  After Visit Summary (AVS) provided to pt after visit with additional instructions as needed/reviewed.      Future Appointments   Date Time Provider Department Center   2024 11:00 AM Constantin Martinez MD [facility]   --Updated future visits after patient check-out.  --Appt above scheduled by CMA (TC) at visit today.      History of Present Illness:

## 2024-06-05 NOTE — PROGRESS NOTES
Rm: 16  Fasting: Yes  Pt wants a referral for GI for her colonoscopy   No    Chief Complaint   Patient presents with    Established New Doctor       /68 (Site: Left Upper Arm, Position: Sitting, Cuff Size: Small Adult)   Pulse 82   Temp 97.8 °F (36.6 °C) (Oral)   Resp 16   Ht 1.676 m (5' 6\")   SpO2 95%   BMI 36.33 kg/m²     1. Have you been to the ER, urgent care clinic since your last visit?  Hospitalized since your last visit?No    2. Have you seen or consulted any other health care providers outside of the [facility] since your last visit?  Include any pap smears or colon screening. Yes Where: Ortho, Cardio and lung doctor          Social Determinants of Health     Tobacco Use: Medium Risk (6/5/2024)    Patient History     Smoking Tobacco Use: Former     Smokeless Tobacco Use: Never     Passive Exposure: Not on file   Alcohol Use: Not on file   Financial Resource Strain: Low Risk  (6/5/2024)    Overall Financial Resource Strain (CARDIA)     Difficulty of Paying Living Expenses: Not very hard   Food Insecurity: No Food Insecurity (6/5/2024)    Hunger Vital Sign     Worried About Running Out of Food in the Last Year: Never true     Ran Out of Food in the Last Year: Never true   Transportation Needs: Unknown (6/5/2024)    PRAPARE - Transportation     Lack of Transportation (Medical): Not on file     Lack of Transportation (Non-Medical): No   Physical Activity: Not on file   Stress: Not on file   Social Connections: Feeling Socially Integrated (2/5/2024)    OASIS : Social Isolation     Frequency of experiencing loneliness or isolation: Never   Intimate Partner Violence: Not on file   Depression: Not at risk (6/5/2024)    PHQ-2     PHQ-2 Score: 0   Housing Stability: Unknown (6/5/2024)    Housing Stability Vital Sign     Unable to Pay for Housing in the Last Year: Not on file     Number of Places Lived in the Last Year: Not on file     Unstable Housing in the Last Year: No

## 2024-06-05 NOTE — PATIENT INSTRUCTIONS
Remain off lisinopril at this time, since BP normal and recent lip swelling.        [facility] Financial Resources*  (Call [employer]/211 if need more resources.)    Medical Care  [employer] Financial Assistance  What they offer: The [employer] Financial Assistance Program helps uninsured patients who do not qualify for government-sponsored health insurance and cannot afford to pay for their medical care. Insured patients may also qualify for assistance based on family income, family size, and medical needs.  Phone Number: [phone]  How to apply for the [employer] Financial Assistance Program:  Option 1: To apply for financial assistance, a patient (or their family or other provider) should fill out the Financial Assistance Application. Copies of the Financial Assistance Application and the FAP may be obtained for free by calling the [employer] service department at [phone].  Option 2: The Financial Assistance Application and policy may be obtained for free by downloading a copy from the [employer] website:  https://www.[employer]/patient-resources/financial-assistance  Applications are available in several languages on the website    [facility] Financial Assistance  What they offer: [facility] has a Financial Assistance Policy that provides free or discounted health care to qualified patients.  Website:  https://[employer]/patient-financial/pricing  Phone Number for Patient Benefit Advisors: [phone]    [facility] Financial Assistance  What they offer: Help with understanding a bill, finding out what insurance pays, applying for financial aid, or setting up a payment plan.  Website:  https://[employer]/services/billing-insurance/furejxmzm-ehbpzqocsa-whlfchtlscg  Financial Counseling Call Center: 967.887.8403      [facility]  What they offer:   Mobile healthcare resources for uninsured patients.  Website:

## 2024-06-06 LAB
25(OH)D3+25(OH)D2 SERPL-MCNC: 46.9 NG/ML (ref 30–100)
ALBUMIN SERPL-MCNC: 3.9 G/DL (ref 3.8–4.8)
ALBUMIN/GLOB SERPL: 1.3 {RATIO} (ref 1.2–2.2)
ALP SERPL-CCNC: 101 IU/L (ref 44–121)
ALT SERPL-CCNC: 8 IU/L (ref 0–32)
AST SERPL-CCNC: 15 IU/L (ref 0–40)
BASOPHILS # BLD AUTO: 0 X10E3/UL (ref 0–0.2)
BASOPHILS NFR BLD AUTO: 1 %
BILIRUB SERPL-MCNC: 0.3 MG/DL (ref 0–1.2)
BUN SERPL-MCNC: 13 MG/DL (ref 8–27)
BUN/CREAT SERPL: 19 (ref 12–28)
CALCIUM SERPL-MCNC: 10 MG/DL (ref 8.7–10.3)
CHLORIDE SERPL-SCNC: 103 MMOL/L (ref 96–106)
CHOLEST SERPL-MCNC: 164 MG/DL (ref 100–199)
CK SERPL-CCNC: 120 U/L (ref 32–182)
CO2 SERPL-SCNC: 24 MMOL/L (ref 20–29)
CREAT SERPL-MCNC: 0.69 MG/DL (ref 0.57–1)
EOSINOPHIL # BLD AUTO: 0.4 X10E3/UL (ref 0–0.4)
EOSINOPHIL NFR BLD AUTO: 5 %
ERYTHROCYTE [DISTWIDTH] IN BLOOD BY AUTOMATED COUNT: 15.7 % (ref 11.7–15.4)
GLOBULIN SER CALC-MCNC: 3.1 G/DL (ref 1.5–4.5)
GLUCOSE SERPL-MCNC: 87 MG/DL (ref 70–99)
HBA1C MFR BLD: 6.4 % (ref 4.8–5.6)
HCT VFR BLD AUTO: 38.1 % (ref 34–46.6)
HDLC SERPL-MCNC: 75 MG/DL
HGB BLD-MCNC: 12.1 G/DL (ref 11.1–15.9)
IMM GRANULOCYTES # BLD AUTO: 0.1 X10E3/UL (ref 0–0.1)
IMM GRANULOCYTES NFR BLD AUTO: 1 %
LDLC SERPL CALC-MCNC: 75 MG/DL (ref 0–99)
LYMPHOCYTES # BLD AUTO: 1.3 X10E3/UL (ref 0.7–3.1)
LYMPHOCYTES NFR BLD AUTO: 14 %
MCH RBC QN AUTO: 27.4 PG (ref 26.6–33)
MCHC RBC AUTO-ENTMCNC: 31.8 G/DL (ref 31.5–35.7)
MCV RBC AUTO: 86 FL (ref 79–97)
MONOCYTES # BLD AUTO: 0.5 X10E3/UL (ref 0.1–0.9)
MONOCYTES NFR BLD AUTO: 6 %
NEUTROPHILS # BLD AUTO: 6.6 X10E3/UL (ref 1.4–7)
NEUTROPHILS NFR BLD AUTO: 73 %
PLATELET # BLD AUTO: 268 X10E3/UL (ref 150–450)
POTASSIUM SERPL-SCNC: 4.7 MMOL/L (ref 3.5–5.2)
PROT SERPL-MCNC: 7 G/DL (ref 6–8.5)
RBC # BLD AUTO: 4.41 X10E6/UL (ref 3.77–5.28)
SODIUM SERPL-SCNC: 141 MMOL/L (ref 134–144)
TRIGL SERPL-MCNC: 76 MG/DL (ref 0–149)
VLDLC SERPL CALC-MCNC: 14 MG/DL (ref 5–40)
WBC # BLD AUTO: 8.8 X10E3/UL (ref 3.4–10.8)

## 2024-07-08 DIAGNOSIS — I10 ESSENTIAL HYPERTENSION: Primary | ICD-10-CM

## 2024-07-08 NOTE — TELEPHONE ENCOUNTER
Pt established care with Dr. Martinez on 06/05/2024.     Last appointment: 06/05/2024 MD Martinez   Next appointment: 07/31/2024 MD Martinez     For Pharmacy Admin Tracking Only    Program: Medication Refill  Intervention Detail: New Rx: 1, reason: Patient Preference  Time Spent (min): 5    Requested Prescriptions     Pending Prescriptions Disp Refills    lisinopril (PRINIVIL;ZESTRIL) 10 MG tablet [Pharmacy Med Name: LISINOPRIL 10 MG TABLET] 90 tablet 0     Sig: TAKE 1 TABLET BY MOUTH EVERY DAY

## 2024-07-14 RX ORDER — LISINOPRIL 10 MG/1
10 TABLET ORAL DAILY
Qty: 90 TABLET | Refills: 1 | Status: SHIPPED | OUTPATIENT
Start: 2024-07-14

## 2024-07-24 ENCOUNTER — HOME HEALTH ADMISSION (OUTPATIENT)
Dept: HOME HEALTH SERVICES | Facility: HOME HEALTH | Age: 74
End: 2024-07-24
Payer: MEDICARE

## 2024-07-26 ENCOUNTER — HOME CARE VISIT (OUTPATIENT)
Facility: HOME HEALTH | Age: 74
End: 2024-07-26
Payer: MEDICARE

## 2024-07-26 VITALS
HEART RATE: 84 BPM | TEMPERATURE: 97 F | SYSTOLIC BLOOD PRESSURE: 110 MMHG | OXYGEN SATURATION: 97 % | DIASTOLIC BLOOD PRESSURE: 60 MMHG | RESPIRATION RATE: 16 BRPM

## 2024-07-26 PROCEDURE — G0151 HHCP-SERV OF PT,EA 15 MIN: HCPCS

## 2024-07-26 ASSESSMENT — ENCOUNTER SYMPTOMS: DYSPNEA ACTIVITY LEVEL: AFTER AMBULATING 10 - 20 FT

## 2024-07-26 NOTE — HOME HEALTH
Reason for referral, [facility] SUMMARY of clinical condition: Ms. Sheehan is a 72 yo female who is referred for  PT due to recent hardware removal from the distal L femur. PMHX includes fall in late 2023 resulting in displaced, comminuted fx of shaft of L femur s/p ORIF, PVD, HTN, HLD, L knee OA, L TKA, lumbar spinal stenosis s/p surgery, L BKA, tachy, UTI, COPD, anemia, peripheral neuropathy R foot drop. At baseline she was independent in the home with RW, L BK prosthesis and wore R AFO and supervision on steps. She also did all the cooking for she and her sister with whom she lives in a split level home with ramp to enter. She stays on the top floor of the home and is currently dependent for bumping up/down the steps to get to her bedroom. She is known to this PT after a course of therapy in Jan/Feb of this year but she was NWBing on the L, unable to wear prosthesis and had painful screw which also prevented wearing her prosthesis. She is now able to WBAT on the L LE and presents with impaired strength, transfers, balance, gait and inability to navigate steps safely with prosthesis. Skilled PT is recommende 1w1 2w6 to address the above impairments so she can meet the goals and return to her highest level of function stated above. She is eager to resume cooking.       Functional Mobility:  Bed Mobility (rolling/scooting): Independent  Transfers (supine to chair and return to supine): Supervision or Touching Assistance.  Patient uses device: no  Gait:  Ambulates with moderate assistance using a walker and L prosthesis, unable to wear R AFO due to shoe size  Safety concerns with mobility include: unsteady gait, impaired balance  and fatigues easily   DME: walker, wheelchair and AFO, prosthesis      Subjective (statement from pt/cg that is relative to why you are there): I can't wait to get back to cooking    Caregiver: relative.  Caregiver assists with: Bathing, ADL, Transportation and Housekeeping Caregiver unable to

## 2024-07-29 ENCOUNTER — HOME CARE VISIT (OUTPATIENT)
Facility: HOME HEALTH | Age: 74
End: 2024-07-29
Payer: MEDICARE

## 2024-07-29 VITALS
HEART RATE: 72 BPM | OXYGEN SATURATION: 96 % | TEMPERATURE: 98.2 F | SYSTOLIC BLOOD PRESSURE: 110 MMHG | DIASTOLIC BLOOD PRESSURE: 64 MMHG | RESPIRATION RATE: 17 BRPM

## 2024-07-29 PROCEDURE — G0151 HHCP-SERV OF PT,EA 15 MIN: HCPCS

## 2024-07-29 ASSESSMENT — ENCOUNTER SYMPTOMS: HEMOPTYSIS: 0

## 2024-07-30 NOTE — HOME HEALTH
Subjective: Patient reports that she wants to be able to walk again   Falls since last visit no  Caregiver involvement changes: no  Home health supplies by type and quantity ordered/delivered this visit include: no    Clinician asked if patient has had any physician contact since last home care visit and patient states: no  Clinician asked if patient has any new or changed medications and patient states:  no  If Yes, were medications reconciled? yes  Was the certifying physician notified of changes in medications? na    Clinical assessment (what this visit means for the patient overall and need for ongoing skilled care) and progress or lack of progress towards SPECIFIC goals: Todays treatment focused on safety, transfers, strengthening and fall prevention, patient was able to make progress towards goals and was able to initiate gait training and perform both seated and standing ther ex today. Patient did require hands on assist for ther ex and mod assist for safety from PT with gait training. Patient required multiple rest breaks during session and remains unsafe ambulating without the assist of PT and will benefit from continued PT in order to achieve gait and transfer goals    Written Teaching Material Utilized: written copy of exercises given to patient     Interdisciplinary communication with: PT only    Discharge planning as follows: reassess at the end of the orders    Specific plan for next visit: continue to progress transfers and gait
Stable

## 2024-07-31 ENCOUNTER — OFFICE VISIT (OUTPATIENT)
Age: 74
End: 2024-07-31
Payer: MEDICARE

## 2024-07-31 VITALS
HEART RATE: 80 BPM | OXYGEN SATURATION: 98 % | SYSTOLIC BLOOD PRESSURE: 122 MMHG | RESPIRATION RATE: 17 BRPM | HEIGHT: 66 IN | DIASTOLIC BLOOD PRESSURE: 65 MMHG | TEMPERATURE: 97.3 F | BODY MASS INDEX: 36.33 KG/M2

## 2024-07-31 DIAGNOSIS — Z89.512 HX OF BKA, LEFT (HCC): ICD-10-CM

## 2024-07-31 DIAGNOSIS — I10 ESSENTIAL HYPERTENSION: Primary | ICD-10-CM

## 2024-07-31 DIAGNOSIS — R73.03 PREDIABETES: ICD-10-CM

## 2024-07-31 DIAGNOSIS — E78.5 HYPERLIPIDEMIA, UNSPECIFIED HYPERLIPIDEMIA TYPE: ICD-10-CM

## 2024-07-31 PROCEDURE — 3074F SYST BP LT 130 MM HG: CPT | Performed by: INTERNAL MEDICINE

## 2024-07-31 PROCEDURE — 1123F ACP DISCUSS/DSCN MKR DOCD: CPT | Performed by: INTERNAL MEDICINE

## 2024-07-31 PROCEDURE — 3078F DIAST BP <80 MM HG: CPT | Performed by: INTERNAL MEDICINE

## 2024-07-31 PROCEDURE — 99214 OFFICE O/P EST MOD 30 MIN: CPT | Performed by: INTERNAL MEDICINE

## 2024-07-31 SDOH — ECONOMIC STABILITY: FOOD INSECURITY: WITHIN THE PAST 12 MONTHS, THE FOOD YOU BOUGHT JUST DIDN'T LAST AND YOU DIDN'T HAVE MONEY TO GET MORE.: NEVER TRUE

## 2024-07-31 SDOH — ECONOMIC STABILITY: FOOD INSECURITY: WITHIN THE PAST 12 MONTHS, YOU WORRIED THAT YOUR FOOD WOULD RUN OUT BEFORE YOU GOT MONEY TO BUY MORE.: NEVER TRUE

## 2024-07-31 ASSESSMENT — PATIENT HEALTH QUESTIONNAIRE - PHQ9
2. FEELING DOWN, DEPRESSED OR HOPELESS: NOT AT ALL
SUM OF ALL RESPONSES TO PHQ QUESTIONS 1-9: 0
SUM OF ALL RESPONSES TO PHQ QUESTIONS 1-9: 0
1. LITTLE INTEREST OR PLEASURE IN DOING THINGS: NOT AT ALL
SUM OF ALL RESPONSES TO PHQ9 QUESTIONS 1 & 2: 0
SUM OF ALL RESPONSES TO PHQ QUESTIONS 1-9: 0
SUM OF ALL RESPONSES TO PHQ QUESTIONS 1-9: 0

## 2024-07-31 NOTE — PROGRESS NOTES
Lynn Sheehan (: 1950) is a 73 y.o. female, established patient, here for evaluation of the following chief complaint(s):  Chief Complaint   Patient presents with    Follow-up     6-8 week follow up       Assessment and Plan:      Diagnosis Orders   1. Essential hypertension        2. Hx of BKA, left (HCC)        3. Prediabetes            1:  Continue current medications.    1-3:  Future Lab monitoring reviewed.      Return in about 2 months (around 2024) for Medicare Wellness visit, Blood Pressure follow-up, fasting labs, --needs 30min visit.  lab results and schedule of future lab studies reviewed with patient  reviewed medications and side effects in detail    Plan and evaluation (above) reviewed with pt at visit  Patient voiced understanding of plan and provided with time to ask/review questions.  After Visit Summary (AVS) provided to pt after visit with additional instructions as needed/reviewed.        Future Appointments   Date Time Provider Department Center   2024  2:00 PM Zora Varela, PT [facility]   2024 To Be Determined NicholeZora, PT [facility]   2024 To Be Determined NicholeKhushbooZora, PT [facility]   2024 To Be Determined NicholeKhushbooZora, PT [facility]   8/15/2024 To Be Determined NicholeKhushbooZora, PT [facility]   2024 To Be Determined NicholeKhushbooZora, PT [facility]   2024 To Be Determined Nichole Zora, PT [facility]   2024 To Be Determined Nichole, Zora, PT [facility]   2024 To Be Determined Nichole Zora, PT [facility]     --Updated future visits after patient check-out.  Future Appointments   Date Time Provider Department Center   2024 To Be Determined Rashawn Condon, PTA [facility]   8/15/2024 To Be Determined Rashawn Condon, PTA [facility]   2024 To Be Determined Rashawn Condon, PTA 
RM: 16  Chief Complaint   Patient presents with    Follow-up     6-8 week follow up      Vitals:    07/31/24 1050   BP: 122/65   Pulse: 80   Resp: 17   Temp: 97.3 °F (36.3 °C)   SpO2: 98%      FASTING: Yes  Have you been to the ER, urgent care clinic since your last visit?  Hospitalized since your last visit?\"    NO  “Have you seen or consulted any other health care providers outside of [facility] since your last visit?”    NO    Click Here for Release of Records Request   
None known

## 2024-07-31 NOTE — PATIENT INSTRUCTIONS
day appointments    [facility] ([facility])  What they offer:  Mammograms and PAP smears regardless of ability to pay.  Contact: [phone]        [facility] Health Financial Assistance  What they offer: [facility] provides financial assistance to patients based on their income, assets, and needs. Assistance may also be provided in obtaining free or low-cost health insurance or arranging a manageable payment plan.  Website: [email or web address]  Assistance application can be downloaded from above website  Financial Counseling Call Center: [phone]          Medications    Good [facility]  What they offer: [facility] [employer] tracks prescription drug prices and provides free drug coupons for discounts on medications.  Website: https://www.PollGround/  [employer]  What they offer: [employer] offers free information on medications and healthcare cost savings programs including prescription assistance programs, coupons, and discount programs.  Website: https://www.ABC Live.org/  Helpline: 814.627.3560    RX Assist  What they offer: Information about free and low-cost medicine programs.  Website: https://www.rxassist.org/    [employer] $4 Prescription Program  What they offer: Prescription Program includes up to a 30-day supply for $4 and a 90-day supply for $10 of some covered generic drugs at commonly prescribed dosages  Website: https://www.Portr/cp/4-prescriptions/8841332Aluainqnw  [employer]  What they offer: Partnership with the [place] Department of . Assist with finding and applying for government funded programs and benefits. You can also update your benefits or report changes through [employer].  Website: https://www.[employer].[place].Kenshoo/  Phone Number: 294-3CGUPTY (445-092-7278)    [employer]  What they offer: [employer] is [employer] energy assistance program of last resort for anyone who faces financial hardships

## 2024-08-01 ENCOUNTER — HOME CARE VISIT (OUTPATIENT)
Facility: HOME HEALTH | Age: 74
End: 2024-08-01
Payer: MEDICARE

## 2024-08-01 VITALS
OXYGEN SATURATION: 98 % | TEMPERATURE: 97.8 F | SYSTOLIC BLOOD PRESSURE: 126 MMHG | RESPIRATION RATE: 17 BRPM | DIASTOLIC BLOOD PRESSURE: 74 MMHG | HEART RATE: 73 BPM

## 2024-08-01 PROCEDURE — G0151 HHCP-SERV OF PT,EA 15 MIN: HCPCS

## 2024-08-01 ASSESSMENT — ENCOUNTER SYMPTOMS: HEMOPTYSIS: 0

## 2024-08-01 NOTE — HOME HEALTH
Subjective: Patient reports that she is very motivated to  keep progressing with her PT   Falls since last visit - no  Caregiver involvement changes: no  Home health supplies by type and quantity ordered/delivered this visit include: no    Clinician asked if patient has had any physician contact since last home care visit and patient states: no  Clinician asked if patient has any new or changed medications and patient states: no  If Yes, were medications reconciled? na  Was the certifying physician notified of changes in medications? yes    Clinical assessment (what this visit means for the patient overall and need for ongoing skilled care) and progress or lack of progress towards SPECIFIC goals: Todays treatment focused on progression of HEP, addition of new supine ther ex, transfers and standing. Patient is very motivated to make progress with PT and is in a hurry to regain her ability to ambulate and ambulate stairs but patient has difficulty with gait training today. Patient presented with increased L quad and hip weakness and PT added supine ther ex to focus on strengthening. Patient will benefit from continued strengthening in order to enable to her begin to progress her gait training and eventually progress to stairs.     Written Teaching Material Utilized: written copy of exercises given to patient     Interdisciplinary communication with: PT only    Discharge planning as follows: reassess at the end of the orders    Specific plan for next visit: continue to focus on L quad and hip strengthening in order to enable patient to progress gait training

## 2024-08-05 ENCOUNTER — HOME CARE VISIT (OUTPATIENT)
Facility: HOME HEALTH | Age: 74
End: 2024-08-05
Payer: MEDICARE

## 2024-08-05 VITALS
RESPIRATION RATE: 16 BRPM | HEART RATE: 71 BPM | TEMPERATURE: 98.2 F | DIASTOLIC BLOOD PRESSURE: 61 MMHG | OXYGEN SATURATION: 97 % | SYSTOLIC BLOOD PRESSURE: 107 MMHG

## 2024-08-05 PROCEDURE — G0157 HHC PT ASSISTANT EA 15: HCPCS

## 2024-08-07 ENCOUNTER — HOME CARE VISIT (OUTPATIENT)
Facility: HOME HEALTH | Age: 74
End: 2024-08-07
Payer: MEDICARE

## 2024-08-07 PROCEDURE — G0157 HHC PT ASSISTANT EA 15: HCPCS

## 2024-08-07 NOTE — HOME HEALTH
Subjective:  Patient denies pain of any kind today.  Patient states that she requires no assistance with transfers or bed mobility.  Patient notes diligent f/u with HEP since last PT visit.  Falls since last visit: NO  Caregiver involvement changes: NO  Home health supplies by type and quantity ordered/delivered this visit include: NO    Clinician asked if patient has had any physician contact since last home care visit and patient states: NO  Clinician asked if patient has any new or changed medications and patient states: NO  If Yes, were medications reconciled? N/A  Was the certifying physician notified of changes in medications? N/A    Clinical assessment (what this visit means for the patient overall and need for ongoing skilled care) and progress or lack of progress towards SPECIFIC goals:  Patient's gait pattern was kyphotic with increased WB through BUE's, poor RW placement and poor step to pattern sequencing.  Patient was unable to improve gait pattern with cueing today.  Patient was quickly fatigued and limited in ambulation distance with RW gait training in home today.  Patient's RW is too low forcing her to hunch forward with poor posture, but patient would not let this PTA adjust height of RW today.  Added several new sitting and supine BLE therapeutic strength exercises today.  Patient required cueing for technique, and to use slow controlled movements with performance of all BLE therapeutic strength exercises today.  Patient's poor safety awareness with transfers and household ambulation; and compromised BLE strength and overall balance make patient a high risk for falls.    Written Teaching Material Utilized: Patient/CG received written/pictorial HEP for all sitting and supported standing therapeutic strength exercises performed today.    Interdisciplinary communication with: Khushboo Varela, PT re:  PT POC.    Discharge planning as follows:  Discharge when all PT goals are met or maximum benefit

## 2024-08-08 VITALS
SYSTOLIC BLOOD PRESSURE: 103 MMHG | HEART RATE: 77 BPM | RESPIRATION RATE: 16 BRPM | DIASTOLIC BLOOD PRESSURE: 60 MMHG | TEMPERATURE: 97.9 F | OXYGEN SATURATION: 98 %

## 2024-08-09 NOTE — HOME HEALTH
Subjective:  Patient denies pain of any kind again today.  Patient reports diligent f/u with HEP since last PT visit.  Falls since last visit: NO  Caregiver involvement changes: NO  Home health supplies by type and quantity ordered/delivered this visit include: NO    Clinician asked if patient has had any physician contact since last home care visit and patient states: NO  Clinician asked if patient has any new or changed medications and patient states: NO  If Yes, were medications reconciled? N/A  Was the certifying physician notified of changes in medications? N/A    Clinical assessment (what this visit means for the patient overall and need for ongoing skilled care) and progress or lack of progress towards SPECIFIC goals:  Patient required SBA and cueing for technique and safety awareness with sit > stand transfers from wheelchair today.  Patient continues to require SBA/CGA and cueing for posture, RW placement, step to pattern sequencing and to increase BLE step lengths with RW gait training today.  Patient was unable to perform the easiest of static balance ex's (feet close together) w/o BUE support d/t BLE weakness today.  Patient remains unable to perform therapeutic strength exercises w/o prompting and cueing for technique and will require additional training.  Patient's poor safety awareness with transfers and household ambulation; as well as compromised BLE strength and overall balance make patient a high risk for falls.    Written Teaching Material Utilized: Patient/CG received written/pictorial HEP for all supine and sitting BLE therapeutic strength exercises performed today.    Interdisciplinary communication with:  N/A    Discharge planning as follows:  Discharge when all PT goals are met or maximum benefit achieved with PT.    Specific plan for next visit: Gait, transfer, strength, balance and fall prevention training as tolerated next session.

## 2024-08-12 ENCOUNTER — HOME CARE VISIT (OUTPATIENT)
Facility: HOME HEALTH | Age: 74
End: 2024-08-12
Payer: MEDICARE

## 2024-08-12 VITALS
TEMPERATURE: 97.9 F | HEART RATE: 78 BPM | OXYGEN SATURATION: 95 % | DIASTOLIC BLOOD PRESSURE: 62 MMHG | SYSTOLIC BLOOD PRESSURE: 113 MMHG | RESPIRATION RATE: 16 BRPM

## 2024-08-12 PROCEDURE — G0157 HHC PT ASSISTANT EA 15: HCPCS

## 2024-08-13 NOTE — HOME HEALTH
Subjective:  Patient denies pain of any kind again today.  Patient notes diligent f/u with HEP since last PT visit.  Patient states that she ordered wide shoes in order to be able to use R AFO.  Falls since last visit: NO  Caregiver involvement changes: NO  Home health supplies by type and quantity ordered/delivered this visit include: NO    Clinician asked if patient has had any physician contact since last home care visit and patient states: NO  Clinician asked if patient has any new or changed medications and patient states: NO  If Yes, were medications reconciled? N/A  Was the certifying physician notified of changes in medications? N/A    Clinical assessment (what this visit means for the patient overall and need for ongoing skilled care) and progress or lack of progress towards SPECIFIC goals:  Patient continues to require SBA and cueing for technique with performance of sit > stand transfers training today.  Patient demonstrates improved gait pattern with RW gait training today.  Balance remains poor and patient is unable to perform any static or dynamic balance exercises.  Patient remains unable to perform therapeutic strength exercises w/o prompting and cueing for technique and will require additional training.  Patient's compromised BLE strength and overall balance make patient a high risk for falls.    Written Teaching Material Utilized:  Patient/CG received written/pictorial HEP for all sitting and supported standing therapeutic strength exercises performed today.    Interdisciplinary communication with:  N/A    Discharge planning as follows:  Discharge when all PT goals are met or maximum benefit achieved with PT.    Specific plan for next visit:  Gait, transfer, strength, balance, and fall prevention training as tolerated next session.

## 2024-08-16 ENCOUNTER — HOME CARE VISIT (OUTPATIENT)
Facility: HOME HEALTH | Age: 74
End: 2024-08-16
Payer: MEDICARE

## 2024-08-16 PROCEDURE — G0157 HHC PT ASSISTANT EA 15: HCPCS

## 2024-08-18 VITALS
OXYGEN SATURATION: 95 % | HEART RATE: 82 BPM | DIASTOLIC BLOOD PRESSURE: 60 MMHG | TEMPERATURE: 97.9 F | SYSTOLIC BLOOD PRESSURE: 105 MMHG | RESPIRATION RATE: 16 BRPM

## 2024-08-19 ENCOUNTER — HOME CARE VISIT (OUTPATIENT)
Facility: HOME HEALTH | Age: 74
End: 2024-08-19
Payer: MEDICARE

## 2024-08-19 PROCEDURE — G0157 HHC PT ASSISTANT EA 15: HCPCS

## 2024-08-19 NOTE — HOME HEALTH
Subjective:  Patient reports diligent f/u with HEP since last PT visit.  Patient denies pain of any kind again today.  Falls since last visit: NO  Caregiver involvement changes: NO  Home health supplies by type and quantity ordered/delivered this visit include: NO    Clinician asked if patient has had any physician contact since last home care visit and patient states: NO  Clinician asked if patient has any new or changed medications and patient states: NO  If Yes, were medications reconciled? N/A  Was the certifying physician notified of changes in medications? N/A    Clinical assessment (what this visit means for the patient overall and need for ongoing skilled care) and progress or lack of progress towards SPECIFIC goals:  Patient remains quickly fatigued and limited in ambulation distance.  Patient continues to require Supervision and cueing for technique and safety awareness with performance of sit > stand transfers from wheelchair today.  Patient remains unable to perform therapeutic strength exercises w/o occasional prompting and cueing for technique and will require additional training.  Patient's poor safety awareness with transfers and household ambulation; and compromised BLE strength and overall balance make patient a high risk for falls.    Written Teaching Material Utilized: Patient/CG received written/pictorial HEP for all supine and sitting therapeutic strength exercises and balance exercises performed today.    Interdisciplinary communication with:  N/A    Discharge planning as follows:  Discharge when all PT goals are met or maximum benefit achieved with PT.    Specific plan for next visit: Gait, transfer, strength, balance, and fall prevention training as tolerated next session.

## 2024-08-21 VITALS
DIASTOLIC BLOOD PRESSURE: 63 MMHG | OXYGEN SATURATION: 94 % | HEART RATE: 68 BPM | TEMPERATURE: 97.9 F | RESPIRATION RATE: 16 BRPM | SYSTOLIC BLOOD PRESSURE: 108 MMHG

## 2024-08-21 NOTE — HOME HEALTH
Subjective:  Patient denies pain of any kind again today.  Patient notes diligent f/u with HEP and Amb Program.  Falls since last visit: NO  Caregiver involvement changes: NO  Home health supplies by type and quantity ordered/delivered this visit include: NO    Clinician asked if patient has had any physician contact since last home care visit and patient states: NO  Clinician asked if patient has any new or changed medications and patient states: NO  If Yes, were medications reconciled? N/A  Was the certifying physician notified of changes in medications? N/A    Clinical assessment (what this visit means for the patient overall and need for ongoing skilled care) and progress or lack of progress towards SPECIFIC goals:  Patient demonstrates improved gait pattern today with increased BLE step length and oscar.  Patient was able to increase ambulation distance with RW gait training today.  Patient continues to require Supervision and cueing for positioning, hand placement and anterior weight shifting with sit > stand transfers from wheelchair today.  Patient remains unable to perform therapeutic strength exercises w/o occasional prompting and cueing for technique and will require additional training.  Patient's decreased safety awareness with transfers and household ambulation; and compromised BLE strength and overall balance make patient a moderate risk for falls.    Written Teaching Material Utilized: Patient/CG received written/pictorial HEP for all sitting and supported standing therapeutic strength exercises performed today.    Interdisciplinary communication with: N/A    Discharge planning as follows:  Discharge when all PT goals are met or maximum benefit achieved with PT.    Specific plan for next visit: Gait, transfer, strength, balance, pain management and fall prevention training as tolerated next session.

## 2024-08-23 ENCOUNTER — HOME CARE VISIT (OUTPATIENT)
Facility: HOME HEALTH | Age: 74
End: 2024-08-23
Payer: MEDICARE

## 2024-08-23 VITALS
DIASTOLIC BLOOD PRESSURE: 62 MMHG | HEART RATE: 59 BPM | OXYGEN SATURATION: 98 % | RESPIRATION RATE: 17 BRPM | TEMPERATURE: 98 F | SYSTOLIC BLOOD PRESSURE: 106 MMHG

## 2024-08-23 PROCEDURE — G0151 HHCP-SERV OF PT,EA 15 MIN: HCPCS

## 2024-08-23 ASSESSMENT — ENCOUNTER SYMPTOMS: HEMOPTYSIS: 0

## 2024-08-24 NOTE — HOME HEALTH
Subjective: Patient reports that she is walking a little bit more but is not strong enough to ambulate stairs  Falls since last visit no  Caregiver involvement changes: no  Home health supplies by type and quantity ordered/delivered this visit include: no    Clinician asked if patient has had any physician contact since last home care visit and patient states: no  Clinician asked if patient has any new or changed medications and patient states: no  If Yes, were medications reconciled? yes  Was the certifying physician notified of changes in medications? na    Clinical assessment (what this visit means for the patient overall and need for ongoing skilled care) and progress or lack of progress towards SPECIFIC goals: Patient has been seen by PT for the past 30 days. PT sessions has included progression of HEP, ther ex, gait training, fall prevention, transfers and patient education Upon SOC, patient was mod assist for transfers and unable to ambulate. Patient has been able to make progress with PT and is now able to perform transfers with CGA to min assist and is able to ambulate up to 60 feet with CGA. Patient remains too weak to ambulate stairs and continues to require hands on assist for safety with gait. patient will benefit from continued PT in order to achieve transfer and gait goals and progress to stair ambulation.    Written Teaching Material Utilized: written copy of exercises given to patient     Interdisciplinary communication with: PT only    Discharge planning as follows: reassess at the end of the orders     Specific plan for next visit: extend PT x 3 additional weeks

## 2024-08-28 ENCOUNTER — HOME CARE VISIT (OUTPATIENT)
Facility: HOME HEALTH | Age: 74
End: 2024-08-28

## 2024-08-30 ENCOUNTER — HOME CARE VISIT (OUTPATIENT)
Facility: HOME HEALTH | Age: 74
End: 2024-08-30

## 2024-09-03 ENCOUNTER — HOME CARE VISIT (OUTPATIENT)
Facility: HOME HEALTH | Age: 74
End: 2024-09-03
Payer: MEDICARE

## 2024-09-04 VITALS
OXYGEN SATURATION: 97 % | SYSTOLIC BLOOD PRESSURE: 113 MMHG | DIASTOLIC BLOOD PRESSURE: 64 MMHG | RESPIRATION RATE: 16 BRPM | TEMPERATURE: 98 F | HEART RATE: 78 BPM

## 2024-09-04 NOTE — HOME HEALTH
Subjective:  Patient states that she had a cold and was unable to perform HEP or Amb Program last week.  Patient denies pain of any kind again today.  Falls since last visit: NO  Caregiver involvement changes: NO  Home health supplies by type and quantity ordered/delivered this visit include: NO    Clinician asked if patient has had any physician contact since last home care visit and patient states: NO  Clinician asked if patient has any new or changed medications and patient states: NO  If Yes, were medications reconciled? N/A  Was the certifying physician notified of changes in medications? N/A    Clinical assessment (what this visit means for the patient overall and need for ongoing skilled care) and progress or lack of progress towards SPECIFIC goals:  Patient presents with decreased overall strength and endurance today.  Patient continues to require Supervision and cueing for technique and safety awareness with sit > stand transfers from wheelchair today.  Patient was quickly fatigued and limited to 40 feet of ambulation with 2WW gait training today.  Patient remains unable to perform therapeutic strength exercises w/o prompting and cueing for technique and will require additional training to master HEP.  Patient's poor safety awareness with transfers and household ambulation; and compromised BLE strength and overall balance make patient a continued high risk for falls.    Written Teaching Material Utilized: Patient/CG received written/pictorial HEP for all sitting and supported standing therapeutic strength exercises performed today.    Interdisciplinary communication with: Zora Varela, PT re:  PT POC.    Discharge planning as follows:  Discharge when all PT goals are met or maximum benefit achieved with PT.    Specific plan for next visit: Gait, transfer, strength, balance, pain management and fall prevention training as tolerated next session.
DISPLAY PLAN FREE TEXT

## 2024-09-05 ENCOUNTER — HOME CARE VISIT (OUTPATIENT)
Facility: HOME HEALTH | Age: 74
End: 2024-09-05
Payer: MEDICARE

## 2024-09-05 PROCEDURE — G0157 HHC PT ASSISTANT EA 15: HCPCS

## 2024-09-06 VITALS
HEART RATE: 78 BPM | TEMPERATURE: 97.6 F | RESPIRATION RATE: 16 BRPM | DIASTOLIC BLOOD PRESSURE: 61 MMHG | OXYGEN SATURATION: 98 % | SYSTOLIC BLOOD PRESSURE: 110 MMHG

## 2024-09-06 NOTE — HOME HEALTH
Subjective:  Patient denies pain of any kind again today.  Patient notes improved follow up with HEP and Amb Program since last PT visit.  Falls since last visit: NO  Caregiver involvement changes: NO  Home health supplies by type and quantity ordered/delivered this visit include: NO    Clinician asked if patient has had any physician contact since last home care visit and patient states: NO  Clinician asked if patient has any new or changed medications and patient states: NO  If Yes, were medications reconciled? N/A  Was the certifying physician notified of changes in medications? N/A    Clinical assessment (what this visit means for the patient overall and need for ongoing skilled care) and progress or lack of progress towards SPECIFIC goals:  Patient continues to have decreased overall strength from cold last weak.  Patient was limited to 30 feet of ambulation before requiring a sitting rest break d/t fatigue today.  Patient was able to improve gait pattern with cueing for posture, RW placement and to increase BLE step length today.  Patient remains unable to perform BLE therapeutic strength exercises w/o prompting and cueing for technique and will require additional training to master HEP.  Patient's poor safety awareness with transfers and household ambulation; and compromised BLE strength and overall balance make patient a continued high risk for falls.    Written Teaching Material Utilized: Patient/CG received written/pictorial HEP for all sitting and supported standing therapeutic strength exercises performed today.    Interdisciplinary communication with:  N/A    Discharge planning as follows:  Discharge when all PT goals are met or maximum benefit achieved with PT.    Specific plan for next visit: Gait, transfer, strength, balance, pain management and fall prevention training as tolerated next session.

## 2024-09-09 ENCOUNTER — HOME CARE VISIT (OUTPATIENT)
Facility: HOME HEALTH | Age: 74
End: 2024-09-09
Payer: MEDICARE

## 2024-09-09 PROCEDURE — G0157 HHC PT ASSISTANT EA 15: HCPCS

## 2024-09-10 VITALS
SYSTOLIC BLOOD PRESSURE: 105 MMHG | TEMPERATURE: 97.9 F | HEART RATE: 68 BPM | OXYGEN SATURATION: 99 % | DIASTOLIC BLOOD PRESSURE: 62 MMHG | RESPIRATION RATE: 16 BRPM

## 2024-09-13 ENCOUNTER — HOME CARE VISIT (OUTPATIENT)
Facility: HOME HEALTH | Age: 74
End: 2024-09-13
Payer: MEDICARE

## 2024-09-13 PROCEDURE — G0157 HHC PT ASSISTANT EA 15: HCPCS

## 2024-09-15 VITALS
DIASTOLIC BLOOD PRESSURE: 63 MMHG | OXYGEN SATURATION: 95 % | HEART RATE: 80 BPM | SYSTOLIC BLOOD PRESSURE: 110 MMHG | TEMPERATURE: 98.4 F | RESPIRATION RATE: 16 BRPM

## 2024-09-17 ENCOUNTER — HOME CARE VISIT (OUTPATIENT)
Dept: HOME HEALTH SERVICES | Facility: HOME HEALTH | Age: 74
End: 2024-09-17
Payer: MEDICARE

## 2024-09-18 ENCOUNTER — HOME CARE VISIT (OUTPATIENT)
Facility: HOME HEALTH | Age: 74
End: 2024-09-18
Payer: MEDICARE

## 2024-09-18 VITALS
RESPIRATION RATE: 17 BRPM | HEART RATE: 78 BPM | DIASTOLIC BLOOD PRESSURE: 76 MMHG | SYSTOLIC BLOOD PRESSURE: 135 MMHG | TEMPERATURE: 98.2 F | OXYGEN SATURATION: 97 %

## 2024-09-18 PROCEDURE — G0151 HHCP-SERV OF PT,EA 15 MIN: HCPCS

## 2024-09-18 ASSESSMENT — ENCOUNTER SYMPTOMS: HEMOPTYSIS: 0

## 2024-12-22 DIAGNOSIS — M81.0 AGE-RELATED OSTEOPOROSIS WITHOUT CURRENT PATHOLOGICAL FRACTURE: ICD-10-CM

## 2024-12-23 NOTE — TELEPHONE ENCOUNTER
Last appointment: 07/31/2024 MD Martinez   Next appointment: Nothing scheduled   Previous refill encounter(s):   06/05/2024 Fosamax #12 with 1 refill.     For Pharmacy Admin Tracking Only    Program: Medication Refill  Intervention Detail: New Rx: 1, reason: Patient Preference  Time Spent (min): 5    Requested Prescriptions     Pending Prescriptions Disp Refills    alendronate (FOSAMAX) 70 MG tablet [Pharmacy Med Name: ALENDRONATE SODIUM 70 MG TAB] 12 tablet 0     Sig: TAKE 1 TABLET BY MOUTH ONE TIME PER WEEK

## 2024-12-26 DIAGNOSIS — M81.0 AGE-RELATED OSTEOPOROSIS WITHOUT CURRENT PATHOLOGICAL FRACTURE: ICD-10-CM

## 2024-12-26 DIAGNOSIS — G89.29 CHRONIC BILATERAL LOW BACK PAIN WITHOUT SCIATICA: ICD-10-CM

## 2024-12-26 DIAGNOSIS — M54.50 CHRONIC BILATERAL LOW BACK PAIN WITHOUT SCIATICA: ICD-10-CM

## 2024-12-26 DIAGNOSIS — G54.6 PHANTOM LIMB PAIN (HCC): ICD-10-CM

## 2024-12-26 NOTE — TELEPHONE ENCOUNTER
Pt requested refill(s) via [employer]     Duplicate request:   The request for generic Fosamx 70 mg is pending in the 12/22/2024 encounter waiting for provider to review.     Last appointment: 07/31/2024 MD Martinez   Next appointment: Nothing scheduled   Previous refill encounter(s):   06/05/2024 Neurontin #270 with 1 refill.     No access to PDMP     For Pharmacy Admin Tracking Only    Program: Medication Refill  Intervention Detail: Discontinued Rx: 1, reason: Duplicate Therapy and New Rx: 1, reason: Patient Preference  Time Spent (min): 5    Requested Prescriptions     Pending Prescriptions Disp Refills    gabapentin (NEURONTIN) 400 MG capsule 270 capsule 0     Sig: Take 1 capsule by mouth 3 times daily. Max Daily Amount: 1,200 mg    alendronate (FOSAMAX) 70 MG tablet 12 tablet 1     Sig: Take 1 tablet by mouth every 7 days

## 2024-12-27 RX ORDER — ALENDRONATE SODIUM 70 MG/1
70 TABLET ORAL
Qty: 12 TABLET | Refills: 0 | Status: SHIPPED | OUTPATIENT
Start: 2024-12-27

## 2024-12-27 RX ORDER — GABAPENTIN 400 MG/1
400 CAPSULE ORAL 3 TIMES DAILY
Qty: 90 CAPSULE | Refills: 0 | Status: SHIPPED | OUTPATIENT
Start: 2024-12-27 | End: 2025-01-26

## 2024-12-27 NOTE — TELEPHONE ENCOUNTER
Refill request(s) approved--gabapentin--30-day supply with 0 refill(s).    Prescription Monitoring Program ([place]) database query with fills:              Requested Prescriptions     Signed Prescriptions Disp Refills    gabapentin (NEURONTIN) 400 MG capsule 90 capsule 0     Sig: Take 1 capsule by mouth 3 times daily for 30 days. Needs appt for further refills. Max Daily Amount: 1,200 mg     Authorizing Provider: CRISTIAN GERMAN    alendronate (FOSAMAX) 70 MG tablet 12 tablet 0     Sig: Take 1 tablet by mouth every 7 days Needs appt for further refills.     Authorizing Provider: CRISTIAN GERMAN       MyChart message to pt--to schedule follow-up appt for future refills.

## 2024-12-28 RX ORDER — ALENDRONATE SODIUM 70 MG/1
TABLET ORAL
Qty: 12 TABLET | Refills: 0 | OUTPATIENT
Start: 2024-12-28

## 2025-01-15 DIAGNOSIS — I10 ESSENTIAL HYPERTENSION: ICD-10-CM

## 2025-01-15 NOTE — TELEPHONE ENCOUNTER
Last appointment: 07/31/2024 MD Martinez   Next appointment: Nothing scheduled   Previous refill encounter(s):   07/14/2024 Lisinopril #90 with 1 refill.     For Pharmacy Admin Tracking Only    Program: Medication Refill  Intervention Detail: New Rx: 1, reason: Patient Preference  Time Spent (min): 5    Requested Prescriptions     Pending Prescriptions Disp Refills    lisinopril (PRINIVIL;ZESTRIL) 10 MG tablet [Pharmacy Med Name: LISINOPRIL 10 MG TABLET] 90 tablet 0     Sig: TAKE 1 TABLET BY MOUTH EVERY DAY

## 2025-01-19 RX ORDER — LISINOPRIL 10 MG/1
10 TABLET ORAL DAILY
Qty: 90 TABLET | Refills: 0 | Status: SHIPPED | OUTPATIENT
Start: 2025-01-19

## 2025-01-21 ENCOUNTER — TELEPHONE (OUTPATIENT)
Age: 75
End: 2025-01-21

## 2025-01-22 ENCOUNTER — OFFICE VISIT (OUTPATIENT)
Age: 75
End: 2025-01-22
Payer: MEDICARE

## 2025-01-22 VITALS
SYSTOLIC BLOOD PRESSURE: 118 MMHG | TEMPERATURE: 97.7 F | RESPIRATION RATE: 17 BRPM | HEIGHT: 66 IN | HEART RATE: 83 BPM | DIASTOLIC BLOOD PRESSURE: 79 MMHG | OXYGEN SATURATION: 98 % | BODY MASS INDEX: 36.33 KG/M2

## 2025-01-22 DIAGNOSIS — Z89.512 HX OF BKA, LEFT (HCC): ICD-10-CM

## 2025-01-22 DIAGNOSIS — G54.6 PHANTOM LIMB PAIN (HCC): ICD-10-CM

## 2025-01-22 DIAGNOSIS — I10 ESSENTIAL HYPERTENSION: ICD-10-CM

## 2025-01-22 DIAGNOSIS — I73.9 PERIPHERAL ARTERY DISEASE (HCC): ICD-10-CM

## 2025-01-22 DIAGNOSIS — G54.6 PHANTOM PAIN FOLLOWING AMPUTATION OF LOWER LIMB (HCC): ICD-10-CM

## 2025-01-22 DIAGNOSIS — G89.29 CHRONIC BILATERAL LOW BACK PAIN WITHOUT SCIATICA: ICD-10-CM

## 2025-01-22 DIAGNOSIS — M54.50 CHRONIC BILATERAL LOW BACK PAIN WITHOUT SCIATICA: ICD-10-CM

## 2025-01-22 DIAGNOSIS — E55.9 HYPOVITAMINOSIS D: ICD-10-CM

## 2025-01-22 DIAGNOSIS — M81.0 AGE-RELATED OSTEOPOROSIS WITHOUT CURRENT PATHOLOGICAL FRACTURE: ICD-10-CM

## 2025-01-22 DIAGNOSIS — E78.5 HYPERLIPIDEMIA, UNSPECIFIED HYPERLIPIDEMIA TYPE: ICD-10-CM

## 2025-01-22 DIAGNOSIS — E87.6 HYPOKALEMIA: Primary | ICD-10-CM

## 2025-01-22 PROCEDURE — 99214 OFFICE O/P EST MOD 30 MIN: CPT | Performed by: INTERNAL MEDICINE

## 2025-01-22 PROCEDURE — 1123F ACP DISCUSS/DSCN MKR DOCD: CPT | Performed by: INTERNAL MEDICINE

## 2025-01-22 PROCEDURE — 1159F MED LIST DOCD IN RCRD: CPT | Performed by: INTERNAL MEDICINE

## 2025-01-22 PROCEDURE — 3078F DIAST BP <80 MM HG: CPT | Performed by: INTERNAL MEDICINE

## 2025-01-22 PROCEDURE — 3074F SYST BP LT 130 MM HG: CPT | Performed by: INTERNAL MEDICINE

## 2025-01-22 RX ORDER — METOPROLOL TARTRATE 25 MG/1
25 TABLET, FILM COATED ORAL 2 TIMES DAILY
Qty: 180 TABLET | Refills: 1 | Status: SHIPPED | OUTPATIENT
Start: 2025-01-22

## 2025-01-22 RX ORDER — GABAPENTIN 400 MG/1
400 CAPSULE ORAL 3 TIMES DAILY
Qty: 270 CAPSULE | Refills: 1 | Status: SHIPPED | OUTPATIENT
Start: 2025-01-22 | End: 2025-07-21

## 2025-01-22 RX ORDER — ROSUVASTATIN CALCIUM 10 MG/1
10 TABLET, COATED ORAL DAILY
Qty: 90 TABLET | Refills: 1 | Status: SHIPPED | OUTPATIENT
Start: 2025-01-22

## 2025-01-22 RX ORDER — SENNOSIDES 8.6 MG
650 CAPSULE ORAL EVERY 6 HOURS PRN
Qty: 250 TABLET | Refills: 3 | Status: SHIPPED | OUTPATIENT
Start: 2025-01-22

## 2025-01-22 RX ORDER — LISINOPRIL 10 MG/1
10 TABLET ORAL DAILY
Qty: 90 TABLET | Refills: 1 | Status: SHIPPED | OUTPATIENT
Start: 2025-01-22

## 2025-01-22 RX ORDER — SPIRONOLACTONE 25 MG/1
25 TABLET ORAL DAILY
Qty: 90 TABLET | Refills: 1 | Status: SHIPPED | OUTPATIENT
Start: 2025-01-22

## 2025-01-22 RX ORDER — CLOPIDOGREL BISULFATE 75 MG/1
75 TABLET ORAL DAILY
Qty: 90 TABLET | Refills: 1 | Status: SHIPPED | OUTPATIENT
Start: 2025-01-22

## 2025-01-22 RX ORDER — POTASSIUM CHLORIDE 750 MG/1
10 TABLET, EXTENDED RELEASE ORAL DAILY
Qty: 90 TABLET | Refills: 1 | Status: SHIPPED | OUTPATIENT
Start: 2025-01-22

## 2025-01-22 RX ORDER — ALENDRONATE SODIUM 70 MG/1
70 TABLET ORAL
Qty: 12 TABLET | Refills: 1 | Status: SHIPPED | OUTPATIENT
Start: 2025-01-22

## 2025-01-22 SDOH — ECONOMIC STABILITY: FOOD INSECURITY: WITHIN THE PAST 12 MONTHS, YOU WORRIED THAT YOUR FOOD WOULD RUN OUT BEFORE YOU GOT MONEY TO BUY MORE.: SOMETIMES TRUE

## 2025-01-22 SDOH — ECONOMIC STABILITY: FOOD INSECURITY: WITHIN THE PAST 12 MONTHS, THE FOOD YOU BOUGHT JUST DIDN'T LAST AND YOU DIDN'T HAVE MONEY TO GET MORE.: SOMETIMES TRUE

## 2025-01-22 ASSESSMENT — PATIENT HEALTH QUESTIONNAIRE - PHQ9
2. FEELING DOWN, DEPRESSED OR HOPELESS: SEVERAL DAYS
1. LITTLE INTEREST OR PLEASURE IN DOING THINGS: NOT AT ALL
SUM OF ALL RESPONSES TO PHQ QUESTIONS 1-9: 1
SUM OF ALL RESPONSES TO PHQ9 QUESTIONS 1 & 2: 1
SUM OF ALL RESPONSES TO PHQ QUESTIONS 1-9: 1

## 2025-01-22 NOTE — PROGRESS NOTES
Lynn Sheehan (: 1950) is a 74 y.o. female, established patient, here for evaluation of the following chief complaint(s):  Chief Complaint   Patient presents with    Medication Refill       Assessment and Plan:      Diagnosis Orders   1. Hypokalemia  potassium chloride (KLOR-CON) 10 MEQ extended release tablet    Comprehensive Metabolic Panel      2. Chronic bilateral low back pain without sciatica  gabapentin (NEURONTIN) 400 MG capsule      3. Phantom limb pain (HCC)  gabapentin (NEURONTIN) 400 MG capsule    Comprehensive Metabolic Panel      4. Essential hypertension  lisinopril (PRINIVIL;ZESTRIL) 10 MG tablet    metoprolol tartrate (LOPRESSOR) 25 MG tablet    spironolactone (ALDACTONE) 25 MG tablet    CBC with Auto Differential    Comprehensive Metabolic Panel    Lipid Panel      5. Age-related osteoporosis without current pathological fracture  alendronate (FOSAMAX) 70 MG tablet    Comprehensive Metabolic Panel      6. Hyperlipidemia, unspecified hyperlipidemia type  rosuvastatin (CRESTOR) 10 MG tablet    Comprehensive Metabolic Panel    Lipid Panel    CK    Hemoglobin A1C      7. 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 Hx of BKA, left (HCC)        9. Peripheral artery disease (HCC)  clopidogrel (PLAVIX) 75 MG tablet    CBC with Auto Differential    Comprehensive Metabolic Panel      10. Hypovitaminosis D  vitamin D (CHOLECALCIFEROL) 25 MCG (1000 UT) TABS tablet    Vitamin D 25 Hydroxy          1-10:  Lab monitoring reviewed.  Continue current medications pending lab results/review.  Refill(s) and management reviewed.      Requested Prescriptions     Signed Prescriptions Disp Refills    gabapentin (NEURONTIN) 400 MG capsule 270 capsule 1     Sig: Take 1 capsule by mouth 3 times daily for 180 days. Needs appt for further refills. Max Daily Amount: 1,200 mg    potassium chloride

## 2025-01-22 NOTE — PATIENT INSTRUCTIONS
You can have home health orders faxed to me at fax #[phone].  This is a direct fax to our nurse's station.

## 2025-01-22 NOTE — PROGRESS NOTES
RM: 16  Chief Complaint   Patient presents with    Medication Refill      Vitals:    01/22/25 1007   BP: 118/79   Pulse: 83   Resp: 17   Temp: 97.7 °F (36.5 °C)   SpO2: 98%      FASTING: Yes  Have you been to the ER, urgent care clinic since your last visit?  Hospitalized since your last visit?\"    NO  “Have you seen or consulted any other health care providers outside of [facility] since your last visit?”    NO    Click Here for Release of Records Request

## 2025-01-23 LAB
25(OH)D3 SERPL-MCNC: 45.9 NG/ML (ref 30–100)
ALBUMIN SERPL-MCNC: 3.2 G/DL (ref 3.5–5)
ALBUMIN/GLOB SERPL: 0.8 (ref 1.1–2.2)
ALP SERPL-CCNC: 90 U/L (ref 45–117)
ALT SERPL-CCNC: 18 U/L (ref 12–78)
ANION GAP SERPL CALC-SCNC: 3 MMOL/L (ref 2–12)
AST SERPL-CCNC: 17 U/L (ref 15–37)
BASOPHILS # BLD: 0.02 K/UL (ref 0–0.1)
BASOPHILS NFR BLD: 0.2 % (ref 0–1)
BILIRUB SERPL-MCNC: 0.3 MG/DL (ref 0.2–1)
BUN SERPL-MCNC: 20 MG/DL (ref 6–20)
BUN/CREAT SERPL: 27 (ref 12–20)
CALCIUM SERPL-MCNC: 9.6 MG/DL (ref 8.5–10.1)
CHLORIDE SERPL-SCNC: 106 MMOL/L (ref 97–108)
CHOLEST SERPL-MCNC: 199 MG/DL
CK SERPL-CCNC: 116 U/L (ref 26–192)
CO2 SERPL-SCNC: 29 MMOL/L (ref 21–32)
CREAT SERPL-MCNC: 0.73 MG/DL (ref 0.55–1.02)
DIFFERENTIAL METHOD BLD: ABNORMAL
EOSINOPHIL # BLD: 0.19 K/UL (ref 0–0.4)
EOSINOPHIL NFR BLD: 2.3 % (ref 0–7)
ERYTHROCYTE [DISTWIDTH] IN BLOOD BY AUTOMATED COUNT: 14.5 % (ref 11.5–14.5)
EST. AVERAGE GLUCOSE BLD GHB EST-MCNC: 123 MG/DL
GLOBULIN SER CALC-MCNC: 3.8 G/DL (ref 2–4)
GLUCOSE SERPL-MCNC: 92 MG/DL (ref 65–100)
HBA1C MFR BLD: 5.9 % (ref 4–5.6)
HCT VFR BLD AUTO: 41.2 % (ref 35–47)
HDLC SERPL-MCNC: 98 MG/DL
HDLC SERPL: 2 (ref 0–5)
HGB BLD-MCNC: 12.9 G/DL (ref 11.5–16)
IMM GRANULOCYTES # BLD AUTO: 0.06 K/UL (ref 0–0.04)
IMM GRANULOCYTES NFR BLD AUTO: 0.7 % (ref 0–0.5)
LDLC SERPL CALC-MCNC: 81.2 MG/DL (ref 0–100)
LYMPHOCYTES # BLD: 1.16 K/UL (ref 0.8–3.5)
LYMPHOCYTES NFR BLD: 14.2 % (ref 12–49)
MCH RBC QN AUTO: 29.2 PG (ref 26–34)
MCHC RBC AUTO-ENTMCNC: 31.3 G/DL (ref 30–36.5)
MCV RBC AUTO: 93.2 FL (ref 80–99)
MONOCYTES # BLD: 0.5 K/UL (ref 0–1)
MONOCYTES NFR BLD: 6.1 % (ref 5–13)
NEUTS SEG # BLD: 6.23 K/UL (ref 1.8–8)
NEUTS SEG NFR BLD: 76.5 % (ref 32–75)
NRBC # BLD: 0 K/UL (ref 0–0.01)
NRBC BLD-RTO: 0 PER 100 WBC
PLATELET # BLD AUTO: 238 K/UL (ref 150–400)
PMV BLD AUTO: 10.4 FL (ref 8.9–12.9)
POTASSIUM SERPL-SCNC: 4.4 MMOL/L (ref 3.5–5.1)
PROT SERPL-MCNC: 7 G/DL (ref 6.4–8.2)
RBC # BLD AUTO: 4.42 M/UL (ref 3.8–5.2)
SODIUM SERPL-SCNC: 138 MMOL/L (ref 136–145)
TRIGL SERPL-MCNC: 99 MG/DL
VLDLC SERPL CALC-MCNC: 19.8 MG/DL
WBC # BLD AUTO: 8.2 K/UL (ref 3.6–11)

## 2025-05-22 DIAGNOSIS — I73.9 PERIPHERAL ARTERY DISEASE: ICD-10-CM

## 2025-05-22 DIAGNOSIS — I10 ESSENTIAL HYPERTENSION: ICD-10-CM

## 2025-05-22 DIAGNOSIS — M81.0 AGE-RELATED OSTEOPOROSIS WITHOUT CURRENT PATHOLOGICAL FRACTURE: ICD-10-CM

## 2025-05-22 DIAGNOSIS — E78.5 HYPERLIPIDEMIA, UNSPECIFIED HYPERLIPIDEMIA TYPE: ICD-10-CM

## 2025-05-22 DIAGNOSIS — E87.6 HYPOKALEMIA: ICD-10-CM

## 2025-05-22 RX ORDER — CLOPIDOGREL BISULFATE 75 MG/1
75 TABLET ORAL DAILY
Qty: 90 TABLET | Refills: 1 | OUTPATIENT
Start: 2025-05-22

## 2025-05-22 RX ORDER — ALENDRONATE SODIUM 70 MG/1
70 TABLET ORAL
Qty: 12 TABLET | Refills: 1 | OUTPATIENT
Start: 2025-05-22

## 2025-05-22 RX ORDER — METOPROLOL TARTRATE 25 MG/1
25 TABLET, FILM COATED ORAL 2 TIMES DAILY
Qty: 180 TABLET | Refills: 1 | OUTPATIENT
Start: 2025-05-22

## 2025-05-22 RX ORDER — ROSUVASTATIN CALCIUM 10 MG/1
10 TABLET, COATED ORAL DAILY
Qty: 90 TABLET | Refills: 1 | OUTPATIENT
Start: 2025-05-22

## 2025-05-22 RX ORDER — LISINOPRIL 10 MG/1
10 TABLET ORAL DAILY
Qty: 90 TABLET | Refills: 1 | OUTPATIENT
Start: 2025-05-22

## 2025-05-22 RX ORDER — SPIRONOLACTONE 25 MG/1
25 TABLET ORAL DAILY
Qty: 90 TABLET | Refills: 1 | OUTPATIENT
Start: 2025-05-22

## 2025-05-22 RX ORDER — POTASSIUM CHLORIDE 750 MG/1
10 TABLET, EXTENDED RELEASE ORAL DAILY
Qty: 90 TABLET | Refills: 1 | OUTPATIENT
Start: 2025-05-22

## 2025-05-22 NOTE — TELEPHONE ENCOUNTER
Duplicate request:   01/22/2025:  - Plavix 75 mg #90 with 1 refill,   - Lopressor 25 mg #180 with 1 refill,    - Klor-Con 10 MEQ #90 with 1 refill.   Prescriptions were sent to [facility] #[id number].     For Pharmacy Admin Tracking Only    Program: Medication Refill  Intervention Detail: New Rx: 3, reason: Patient Preference  Time Spent (min): 5    Requested Prescriptions     Pending Prescriptions Disp Refills    potassium chloride (KLOR-CON) 10 MEQ extended release tablet [Pharmacy Med Name: POTASSIUM CL ER 10 MEQ TAB WAX] 90 tablet 1     Sig: TAKE 1 TABLET BY MOUTH EVERY DAY    clopidogrel (PLAVIX) 75 MG tablet [Pharmacy Med Name: CLOPIDOGREL 75 MG TABLET] 90 tablet 1     Sig: TAKE 1 TABLET BY MOUTH EVERY DAY    metoprolol tartrate (LOPRESSOR) 25 MG tablet [Pharmacy Med Name: METOPROLOL TARTRATE 25 MG TAB] 180 tablet 1     Sig: TAKE 1 TABLET BY MOUTH TWICE A DAY

## 2025-05-22 NOTE — TELEPHONE ENCOUNTER
Pt requested refill(s) via [employer]     Duplicate request:   1/22/2025 The requested prescriptions were sent to [id number] all prescribed for 90 day supplies with 1 additional refill.   Writer sent pt a message via [employer].     For Pharmacy Admin Tracking Only    Program: Medication Refill  Intervention Detail: Discontinued Rx: 6, reason: Duplicate Therapy  Time Spent (min): 10    Requested Prescriptions     Pending Prescriptions Disp Refills    lisinopril (PRINIVIL;ZESTRIL) 10 MG tablet 90 tablet 1     Sig: Take 1 tablet by mouth daily    alendronate (FOSAMAX) 70 MG tablet 12 tablet 1     Sig: Take 1 tablet by mouth every 7 days Needs appt for further refills.    metoprolol tartrate (LOPRESSOR) 25 MG tablet 180 tablet 1     Sig: Take 1 tablet by mouth 2 times daily    rosuvastatin (CRESTOR) 10 MG tablet 90 tablet 1     Sig: Take 1 tablet by mouth daily    clopidogrel (PLAVIX) 75 MG tablet 90 tablet 1     Sig: Take 1 tablet by mouth daily    spironolactone (ALDACTONE) 25 MG tablet 90 tablet 1     Sig: Take 1 tablet by mouth daily

## 2025-05-27 DIAGNOSIS — I73.9 PERIPHERAL ARTERY DISEASE: ICD-10-CM

## 2025-05-27 DIAGNOSIS — E78.5 HYPERLIPIDEMIA, UNSPECIFIED HYPERLIPIDEMIA TYPE: ICD-10-CM

## 2025-05-27 DIAGNOSIS — M81.0 AGE-RELATED OSTEOPOROSIS WITHOUT CURRENT PATHOLOGICAL FRACTURE: ICD-10-CM

## 2025-05-27 DIAGNOSIS — I10 ESSENTIAL HYPERTENSION: ICD-10-CM

## 2025-05-27 NOTE — TELEPHONE ENCOUNTER
Future Appointments:  Future Appointments   Date Time Provider Department Center   7/24/2025 10:40 AM Constantin Martinez MD [facility] DEP        Last Appointment With Me:  1/22/2025     Requested Prescriptions     Pending Prescriptions Disp Refills    alendronate (FOSAMAX) 70 MG tablet 12 tablet 1     Sig: Take 1 tablet by mouth every 7 days Needs appt for further refills.    lisinopril (PRINIVIL;ZESTRIL) 10 MG tablet 90 tablet 1     Sig: Take 1 tablet by mouth daily    metoprolol tartrate (LOPRESSOR) 25 MG tablet 180 tablet 1     Sig: Take 1 tablet by mouth 2 times daily    rosuvastatin (CRESTOR) 10 MG tablet 90 tablet 1     Sig: Take 1 tablet by mouth daily    clopidogrel (PLAVIX) 75 MG tablet 90 tablet 1     Sig: Take 1 tablet by mouth daily    spironolactone (ALDACTONE) 25 MG tablet 90 tablet 1     Sig: Take 1 tablet by mouth daily

## 2025-06-09 RX ORDER — ROSUVASTATIN CALCIUM 10 MG/1
10 TABLET, COATED ORAL DAILY
Qty: 90 TABLET | Refills: 1 | Status: SHIPPED | OUTPATIENT
Start: 2025-06-09

## 2025-06-09 RX ORDER — ALENDRONATE SODIUM 70 MG/1
70 TABLET ORAL
Qty: 12 TABLET | Refills: 1 | Status: SHIPPED | OUTPATIENT
Start: 2025-06-09

## 2025-06-09 RX ORDER — CLOPIDOGREL BISULFATE 75 MG/1
75 TABLET ORAL DAILY
Qty: 90 TABLET | Refills: 1 | Status: SHIPPED | OUTPATIENT
Start: 2025-06-09

## 2025-06-09 RX ORDER — LISINOPRIL 10 MG/1
10 TABLET ORAL DAILY
Qty: 90 TABLET | Refills: 1 | Status: SHIPPED | OUTPATIENT
Start: 2025-06-09

## 2025-06-09 RX ORDER — SPIRONOLACTONE 25 MG/1
25 TABLET ORAL DAILY
Qty: 90 TABLET | Refills: 1 | Status: SHIPPED | OUTPATIENT
Start: 2025-06-09

## 2025-06-09 RX ORDER — METOPROLOL TARTRATE 25 MG/1
25 TABLET, FILM COATED ORAL 2 TIMES DAILY
Qty: 180 TABLET | Refills: 1 | Status: SHIPPED | OUTPATIENT
Start: 2025-06-09

## 2025-06-09 NOTE — TELEPHONE ENCOUNTER
Refill request(s) approved--as below--90-day supply with 1 refill.    Refill protocol details (computer-generated) reviewed, as available.      Requested Prescriptions     Signed Prescriptions Disp Refills    alendronate (FOSAMAX) 70 MG tablet 12 tablet 1     Sig: Take 1 tablet by mouth every 7 days Needs appt for further refills.     Authorizing Provider: CRISTIAN GERMAN    lisinopril (PRINIVIL;ZESTRIL) 10 MG tablet 90 tablet 1     Sig: Take 1 tablet by mouth daily     Authorizing Provider: CRISTIAN GERMAN    metoprolol tartrate (LOPRESSOR) 25 MG tablet 180 tablet 1     Sig: Take 1 tablet by mouth 2 times daily     Authorizing Provider: CRISTIAN GERMAN    rosuvastatin (CRESTOR) 10 MG tablet 90 tablet 1     Sig: Take 1 tablet by mouth daily     Authorizing Provider: CRISTIAN GERMAN    clopidogrel (PLAVIX) 75 MG tablet 90 tablet 1     Sig: Take 1 tablet by mouth daily     Authorizing Provider: CRISTIAN GERMAN    spironolactone (ALDACTONE) 25 MG tablet 90 tablet 1     Sig: Take 1 tablet by mouth daily     Authorizing Provider: CRISTIAN GERMAN

## 2025-07-21 ENCOUNTER — TELEPHONE (OUTPATIENT)
Age: 75
End: 2025-07-21

## 2025-07-21 SDOH — HEALTH STABILITY: PHYSICAL HEALTH: ON AVERAGE, HOW MANY DAYS PER WEEK DO YOU ENGAGE IN MODERATE TO STRENUOUS EXERCISE (LIKE A BRISK WALK)?: 0 DAYS

## 2025-07-21 ASSESSMENT — PATIENT HEALTH QUESTIONNAIRE - PHQ9
2. FEELING DOWN, DEPRESSED OR HOPELESS: NOT AT ALL
SUM OF ALL RESPONSES TO PHQ QUESTIONS 1-9: 0
SUM OF ALL RESPONSES TO PHQ QUESTIONS 1-9: 0
1. LITTLE INTEREST OR PLEASURE IN DOING THINGS: NOT AT ALL
SUM OF ALL RESPONSES TO PHQ QUESTIONS 1-9: 0
SUM OF ALL RESPONSES TO PHQ QUESTIONS 1-9: 0

## 2025-07-21 ASSESSMENT — LIFESTYLE VARIABLES
HOW OFTEN DO YOU HAVE A DRINK CONTAINING ALCOHOL: 1
HOW MANY STANDARD DRINKS CONTAINING ALCOHOL DO YOU HAVE ON A TYPICAL DAY: 0
HOW OFTEN DO YOU HAVE A DRINK CONTAINING ALCOHOL: NEVER
HOW OFTEN DO YOU HAVE SIX OR MORE DRINKS ON ONE OCCASION: 1
HOW MANY STANDARD DRINKS CONTAINING ALCOHOL DO YOU HAVE ON A TYPICAL DAY: PATIENT DOES NOT DRINK

## 2025-07-24 ENCOUNTER — OFFICE VISIT (OUTPATIENT)
Age: 75
End: 2025-07-24
Payer: MEDICARE

## 2025-07-24 VITALS
DIASTOLIC BLOOD PRESSURE: 63 MMHG | HEART RATE: 72 BPM | BODY MASS INDEX: 36.33 KG/M2 | RESPIRATION RATE: 16 BRPM | OXYGEN SATURATION: 96 % | TEMPERATURE: 97.6 F | SYSTOLIC BLOOD PRESSURE: 106 MMHG | HEIGHT: 66 IN

## 2025-07-24 DIAGNOSIS — Z89.512 HX OF BKA, LEFT (HCC): ICD-10-CM

## 2025-07-24 DIAGNOSIS — G89.29 CHRONIC BILATERAL LOW BACK PAIN WITHOUT SCIATICA: ICD-10-CM

## 2025-07-24 DIAGNOSIS — Z23 ENCOUNTER FOR IMMUNIZATION: ICD-10-CM

## 2025-07-24 DIAGNOSIS — Z11.59 NEED FOR HEPATITIS C SCREENING TEST: ICD-10-CM

## 2025-07-24 DIAGNOSIS — M81.0 AGE-RELATED OSTEOPOROSIS WITHOUT CURRENT PATHOLOGICAL FRACTURE: ICD-10-CM

## 2025-07-24 DIAGNOSIS — E78.5 HYPERLIPIDEMIA, UNSPECIFIED HYPERLIPIDEMIA TYPE: ICD-10-CM

## 2025-07-24 DIAGNOSIS — Z91.81 HISTORY OF FALL: ICD-10-CM

## 2025-07-24 DIAGNOSIS — Z00.00 MEDICARE ANNUAL WELLNESS VISIT, SUBSEQUENT: Primary | ICD-10-CM

## 2025-07-24 DIAGNOSIS — M54.50 CHRONIC BILATERAL LOW BACK PAIN WITHOUT SCIATICA: ICD-10-CM

## 2025-07-24 DIAGNOSIS — E87.6 HYPOKALEMIA: ICD-10-CM

## 2025-07-24 DIAGNOSIS — G54.6 PHANTOM PAIN FOLLOWING AMPUTATION OF LOWER LIMB (HCC): ICD-10-CM

## 2025-07-24 DIAGNOSIS — M79.671 RIGHT FOOT PAIN: ICD-10-CM

## 2025-07-24 DIAGNOSIS — E55.9 HYPOVITAMINOSIS D: ICD-10-CM

## 2025-07-24 DIAGNOSIS — I10 ESSENTIAL HYPERTENSION: ICD-10-CM

## 2025-07-24 DIAGNOSIS — I73.9 PERIPHERAL ARTERY DISEASE: ICD-10-CM

## 2025-07-24 PROCEDURE — 1125F AMNT PAIN NOTED PAIN PRSNT: CPT | Performed by: INTERNAL MEDICINE

## 2025-07-24 PROCEDURE — G0439 PPPS, SUBSEQ VISIT: HCPCS | Performed by: INTERNAL MEDICINE

## 2025-07-24 PROCEDURE — 3074F SYST BP LT 130 MM HG: CPT | Performed by: INTERNAL MEDICINE

## 2025-07-24 PROCEDURE — 90677 PCV20 VACCINE IM: CPT | Performed by: INTERNAL MEDICINE

## 2025-07-24 PROCEDURE — 3078F DIAST BP <80 MM HG: CPT | Performed by: INTERNAL MEDICINE

## 2025-07-24 PROCEDURE — 1159F MED LIST DOCD IN RCRD: CPT | Performed by: INTERNAL MEDICINE

## 2025-07-24 PROCEDURE — 99214 OFFICE O/P EST MOD 30 MIN: CPT | Performed by: INTERNAL MEDICINE

## 2025-07-24 PROCEDURE — PBSHW PNEUMOCOCCAL, PCV20, PREVNAR 20, (AGE 6W+), IM, PF: Performed by: INTERNAL MEDICINE

## 2025-07-24 PROCEDURE — 1123F ACP DISCUSS/DSCN MKR DOCD: CPT | Performed by: INTERNAL MEDICINE

## 2025-07-24 RX ORDER — LATANOPROST 50 UG/ML
1 SOLUTION/ DROPS OPHTHALMIC NIGHTLY
Status: CANCELLED | OUTPATIENT
Start: 2025-07-24

## 2025-07-24 RX ORDER — GABAPENTIN 400 MG/1
400 CAPSULE ORAL 3 TIMES DAILY
Qty: 270 CAPSULE | Refills: 1 | Status: SHIPPED | OUTPATIENT
Start: 2025-07-24 | End: 2026-01-20

## 2025-07-24 RX ORDER — SENNOSIDES 8.6 MG
650 CAPSULE ORAL EVERY 6 HOURS PRN
Qty: 250 TABLET | Refills: 3 | Status: SHIPPED | OUTPATIENT
Start: 2025-07-24

## 2025-07-24 SDOH — ECONOMIC STABILITY: FOOD INSECURITY: WITHIN THE PAST 12 MONTHS, THE FOOD YOU BOUGHT JUST DIDN'T LAST AND YOU DIDN'T HAVE MONEY TO GET MORE.: NEVER TRUE

## 2025-07-24 SDOH — ECONOMIC STABILITY: FOOD INSECURITY: WITHIN THE PAST 12 MONTHS, YOU WORRIED THAT YOUR FOOD WOULD RUN OUT BEFORE YOU GOT MONEY TO BUY MORE.: NEVER TRUE

## 2025-07-24 NOTE — PROGRESS NOTES
This is a Medicare Annual Wellness Visit (AWV).   The patient's medical history has been reviewed in detail, and  the computerized patient record has been updated as reviewed.    Assessment and Plan:      Diagnosis Orders   1. Medicare annual wellness visit, subsequent  CBC with Auto Differential    Comprehensive Metabolic Panel    Lipid Panel    Hemoglobin A1C      2. Need for hepatitis C screening test  Hepatitis C Antibody      3. Encounter for immunization  Pneumococcal, PCV20, PREVNAR 20, (age 6w+), IM, PF      4. Chronic bilateral low back pain without sciatica  gabapentin (NEURONTIN) 400 MG capsule      5. 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 Hx of BKA, left (HCC)        7. Essential hypertension  CBC with Auto Differential    Comprehensive Metabolic Panel    Lipid Panel      8. Peripheral artery disease  CBC with Auto Differential    Comprehensive Metabolic Panel      9. Age-related osteoporosis without current pathological fracture  Comprehensive Metabolic Panel      10. Hypokalemia  Comprehensive Metabolic Panel      11. Hyperlipidemia, unspecified hyperlipidemia type  Comprehensive Metabolic Panel    Lipid Panel    CK    Hemoglobin A1C      12. Hypovitaminosis D  Vitamin D 25 Hydroxy      13. Right foot pain        14. History of fall--6-23-25.            Diagnoses #1-3 for Medicare Wellness/Preventive visit.    Due to significant separate problems unrelated to Medicare Wellness Visit, also addressed following diagnoses/problems at visit as below (diagnoses #4-14 above).    1-2:  Screenings reviewed at visit.    3:  Immunization(s) reviewed and updated at visit.    4-6:  Lab monitoring reviewed.  Continue current medications pending lab results/review.  Refill(s) and management reviewed.    7-12:  Lab monitoring reviewed.  Continue

## 2025-07-24 NOTE — PATIENT INSTRUCTIONS
these tasks. If so, your doctor can suggest ideas that may help.  To measure what kind of help you may need, your doctor will ask how well you are able to do ADLs. Let your doctor know if there are any tasks that you are having trouble doing. This is an important first step to getting help. And when you have the help you need, you can stay as independent as possible.  How will a doctor assess your ADLs?  Asking about ADLs is part of a routine health checkup your doctor will likely do as you age. Your health check might be done in a doctor's office, in your home, or at a hospital. The goal is to find out if you are having any problems that could make it hard to care for yourself or that make it unsafe for you to be on your own.  To measure your ADLs, your doctor will ask how hard it is for you to do routine tasks. Your doctor may also want to know if you have changed the way you do a task because of a health problem. Your doctor may watch how you:  Walk back and forth.  Keep your balance while you stand or walk.  Move from sitting to standing or from a bed to a chair.  Button or unbutton a shirt or sweater.  Remove and put on your shoes.  It's common to feel a little worried or anxious if you find you can't do all the things you used to be able to do. Talking with your doctor about ADLs is a way to make sure you're as safe as possible and able to care for yourself as well as you can. You may want to bring a caregiver, friend, or family member to your checkup. They can help you talk to your doctor.  Follow-up care is a key part of your treatment and safety. Be sure to make and go to all appointments, and call your doctor if you are having problems. It's also a good idea to know your test results and keep a list of the medicines you take.  Current as of: October 24, 2024  Content Version: 14.5  © 1882-2612 [employer].   Care instructions adapted under license by [employer]. If you have questions about a

## 2025-07-24 NOTE — PROGRESS NOTES
RM:18    Chief Complaint   Patient presents with    Medicare AWV     Patient asked to have her foot checked. Patient had a fall recently.        Fasting Yes    Vitals:    07/24/25 1046   BP: 106/63   BP Site: Right Upper Arm   Patient Position: Sitting   BP Cuff Size: Small Adult   Pulse: 72   Resp: 16   Temp: 97.6 °F (36.4 °C)   TempSrc: Oral   SpO2: 96%   Height: 1.676 m (5' 6\")        Have you been to the ER, urgent care clinic since your last visit?  Hospitalized since your last visit?   NO    Have you seen or consulted any other health care providers outside our system since your last visit?   NO              Click Here for Release of Records Request   AVS  education, follow up, and recommendations provided and addressed with patient.  services used to advise patient

## 2025-07-25 LAB
25(OH)D3 SERPL-MCNC: 39.9 NG/ML (ref 30–100)
ALBUMIN SERPL-MCNC: 3.5 G/DL (ref 3.5–5)
ALBUMIN/GLOB SERPL: 1 (ref 1.1–2.2)
ALP SERPL-CCNC: 106 U/L (ref 45–117)
ALT SERPL-CCNC: 16 U/L (ref 12–78)
ANION GAP SERPL CALC-SCNC: 6 MMOL/L (ref 2–12)
AST SERPL-CCNC: 22 U/L (ref 15–37)
BASOPHILS # BLD: 0.02 K/UL (ref 0–0.1)
BASOPHILS NFR BLD: 0.3 % (ref 0–1)
BILIRUB SERPL-MCNC: 0.2 MG/DL (ref 0.2–1)
BUN SERPL-MCNC: 21 MG/DL (ref 6–20)
BUN/CREAT SERPL: 22 (ref 12–20)
CALCIUM SERPL-MCNC: 9.1 MG/DL (ref 8.5–10.1)
CHLORIDE SERPL-SCNC: 109 MMOL/L (ref 97–108)
CHOLEST SERPL-MCNC: 156 MG/DL
CK SERPL-CCNC: 125 U/L (ref 26–192)
CO2 SERPL-SCNC: 23 MMOL/L (ref 21–32)
CREAT SERPL-MCNC: 0.94 MG/DL (ref 0.55–1.02)
DIFFERENTIAL METHOD BLD: ABNORMAL
EOSINOPHIL # BLD: 0.26 K/UL (ref 0–0.4)
EOSINOPHIL NFR BLD: 4.1 % (ref 0–7)
ERYTHROCYTE [DISTWIDTH] IN BLOOD BY AUTOMATED COUNT: 14.1 % (ref 11.5–14.5)
EST. AVERAGE GLUCOSE BLD GHB EST-MCNC: 117 MG/DL
GLOBULIN SER CALC-MCNC: 3.5 G/DL (ref 2–4)
GLUCOSE SERPL-MCNC: 97 MG/DL (ref 65–100)
HBA1C MFR BLD: 5.7 % (ref 4–5.6)
HCT VFR BLD AUTO: 39.5 % (ref 35–47)
HCV AB SER IA-ACNC: <0.02 INDEX
HCV AB SERPL QL IA: NONREACTIVE
HDLC SERPL-MCNC: 86 MG/DL
HDLC SERPL: 1.8 (ref 0–5)
HGB BLD-MCNC: 12.4 G/DL (ref 11.5–16)
IMM GRANULOCYTES # BLD AUTO: 0.04 K/UL (ref 0–0.04)
IMM GRANULOCYTES NFR BLD AUTO: 0.6 % (ref 0–0.5)
LDLC SERPL CALC-MCNC: 53.2 MG/DL (ref 0–100)
LYMPHOCYTES # BLD: 1.28 K/UL (ref 0.8–3.5)
LYMPHOCYTES NFR BLD: 20.1 % (ref 12–49)
MCH RBC QN AUTO: 30 PG (ref 26–34)
MCHC RBC AUTO-ENTMCNC: 31.4 G/DL (ref 30–36.5)
MCV RBC AUTO: 95.4 FL (ref 80–99)
MONOCYTES # BLD: 0.42 K/UL (ref 0–1)
MONOCYTES NFR BLD: 6.6 % (ref 5–13)
NEUTS SEG # BLD: 4.36 K/UL (ref 1.8–8)
NEUTS SEG NFR BLD: 68.3 % (ref 32–75)
NRBC # BLD: 0 K/UL (ref 0–0.01)
NRBC BLD-RTO: 0 PER 100 WBC
PLATELET # BLD AUTO: 251 K/UL (ref 150–400)
PMV BLD AUTO: 11 FL (ref 8.9–12.9)
POTASSIUM SERPL-SCNC: 4.2 MMOL/L (ref 3.5–5.1)
PROT SERPL-MCNC: 7 G/DL (ref 6.4–8.2)
RBC # BLD AUTO: 4.14 M/UL (ref 3.8–5.2)
SODIUM SERPL-SCNC: 138 MMOL/L (ref 136–145)
TRIGL SERPL-MCNC: 84 MG/DL
VLDLC SERPL CALC-MCNC: 16.8 MG/DL
WBC # BLD AUTO: 6.4 K/UL (ref 3.6–11)

## 2025-08-06 ENCOUNTER — HOSPITAL ENCOUNTER (OUTPATIENT)
Facility: HOSPITAL | Age: 75
Discharge: HOME | End: 2025-08-09
Payer: MEDICARE

## 2025-08-06 VITALS
HEIGHT: 66 IN | RESPIRATION RATE: 18 BRPM | OXYGEN SATURATION: 100 % | DIASTOLIC BLOOD PRESSURE: 64 MMHG | WEIGHT: 190 LBS | SYSTOLIC BLOOD PRESSURE: 129 MMHG | TEMPERATURE: 97.1 F | BODY MASS INDEX: 30.53 KG/M2 | HEART RATE: 71 BPM

## 2025-08-06 LAB
ALBUMIN SERPL-MCNC: 3.5 G/DL (ref 3.5–5.2)
ALBUMIN/GLOB SERPL: 1.1 (ref 1.1–2.2)
ALP SERPL-CCNC: 104 U/L (ref 35–104)
ALT SERPL-CCNC: 11 U/L (ref 10–35)
ANION GAP SERPL CALC-SCNC: 10 MMOL/L (ref 2–14)
APTT PPP: 25.4 SEC (ref 22.1–31)
AST SERPL-CCNC: 18 U/L (ref 10–35)
BASOPHILS # BLD: 0.02 K/UL (ref 0–0.1)
BASOPHILS NFR BLD: 0.2 % (ref 0–1)
BILIRUB SERPL-MCNC: 0.3 MG/DL (ref 0–1.2)
BUN SERPL-MCNC: 14 MG/DL (ref 8–23)
BUN/CREAT SERPL: 21 (ref 12–20)
CALCIUM SERPL-MCNC: 9.6 MG/DL (ref 8.8–10.2)
CHLORIDE SERPL-SCNC: 107 MMOL/L (ref 98–107)
CO2 SERPL-SCNC: 26 MMOL/L (ref 20–29)
CREAT SERPL-MCNC: 0.67 MG/DL (ref 0.6–1)
DIFFERENTIAL METHOD BLD: ABNORMAL
EKG ATRIAL RATE: 77 BPM
EKG DIAGNOSIS: NORMAL
EKG P AXIS: 65 DEGREES
EKG P-R INTERVAL: 162 MS
EKG Q-T INTERVAL: 408 MS
EKG QRS DURATION: 140 MS
EKG QTC CALCULATION (BAZETT): 461 MS
EKG R AXIS: -43 DEGREES
EKG T AXIS: 31 DEGREES
EKG VENTRICULAR RATE: 77 BPM
EOSINOPHIL # BLD: 0.18 K/UL (ref 0–0.4)
EOSINOPHIL NFR BLD: 2.2 % (ref 0–7)
ERYTHROCYTE [DISTWIDTH] IN BLOOD BY AUTOMATED COUNT: 14.6 % (ref 11.5–14.5)
GLOBULIN SER CALC-MCNC: 3.3 G/DL (ref 2–4)
GLUCOSE SERPL-MCNC: 95 MG/DL (ref 65–100)
HCT VFR BLD AUTO: 37.7 % (ref 35–47)
HGB BLD-MCNC: 12.1 G/DL (ref 11.5–16)
IMM GRANULOCYTES # BLD AUTO: 0.05 K/UL (ref 0–0.04)
IMM GRANULOCYTES NFR BLD AUTO: 0.6 % (ref 0–0.5)
INR PPP: 1 (ref 0.9–1.1)
LYMPHOCYTES # BLD: 1.42 K/UL (ref 0.8–3.5)
LYMPHOCYTES NFR BLD: 17.5 % (ref 12–49)
MCH RBC QN AUTO: 29.6 PG (ref 26–34)
MCHC RBC AUTO-ENTMCNC: 32.1 G/DL (ref 30–36.5)
MCV RBC AUTO: 92.2 FL (ref 80–99)
MONOCYTES # BLD: 0.43 K/UL (ref 0–1)
MONOCYTES NFR BLD: 5.3 % (ref 5–13)
NEUTS SEG # BLD: 6 K/UL (ref 1.8–8)
NEUTS SEG NFR BLD: 74.2 % (ref 32–75)
NRBC # BLD: 0 K/UL (ref 0–0.01)
NRBC BLD-RTO: 0 PER 100 WBC
PLATELET # BLD AUTO: 254 K/UL (ref 150–400)
PMV BLD AUTO: 9.9 FL (ref 8.9–12.9)
POTASSIUM SERPL-SCNC: 4.2 MMOL/L (ref 3.5–5.1)
PROT SERPL-MCNC: 6.8 G/DL (ref 6.4–8.3)
PROTHROMBIN TIME: 10.6 SEC (ref 9.2–11.2)
RBC # BLD AUTO: 4.09 M/UL (ref 3.8–5.2)
SODIUM SERPL-SCNC: 142 MMOL/L (ref 136–145)
THERAPEUTIC RANGE: NORMAL SECS (ref 58–77)
WBC # BLD AUTO: 8.1 K/UL (ref 3.6–11)

## 2025-08-06 PROCEDURE — 80053 COMPREHEN METABOLIC PANEL: CPT

## 2025-08-06 PROCEDURE — 85025 COMPLETE CBC W/AUTO DIFF WBC: CPT

## 2025-08-06 PROCEDURE — 85730 THROMBOPLASTIN TIME PARTIAL: CPT

## 2025-08-06 PROCEDURE — 93010 ELECTROCARDIOGRAM REPORT: CPT | Performed by: INTERNAL MEDICINE

## 2025-08-06 PROCEDURE — 36415 COLL VENOUS BLD VENIPUNCTURE: CPT

## 2025-08-06 PROCEDURE — 93005 ELECTROCARDIOGRAM TRACING: CPT | Performed by: NURSE PRACTITIONER

## 2025-08-06 PROCEDURE — 85610 PROTHROMBIN TIME: CPT

## 2025-08-06 ASSESSMENT — ENCOUNTER SYMPTOMS
CHEST TIGHTNESS: 0
NAUSEA: 0
COUGH: 0
SINUS PRESSURE: 0
RHINORRHEA: 0
COLOR CHANGE: 1
VOICE CHANGE: 0
CHOKING: 0
SINUS PAIN: 0
CONSTIPATION: 0
SORE THROAT: 0
TROUBLE SWALLOWING: 0
WHEEZING: 0
APNEA: 0
STRIDOR: 0
SHORTNESS OF BREATH: 0

## 2025-08-06 ASSESSMENT — PAIN DESCRIPTION - DESCRIPTORS: DESCRIPTORS: ACHING

## 2025-08-06 ASSESSMENT — PAIN DESCRIPTION - LOCATION: LOCATION: FOOT

## 2025-08-06 ASSESSMENT — PAIN DESCRIPTION - ORIENTATION: ORIENTATION: RIGHT

## 2025-08-06 ASSESSMENT — PAIN SCALES - GENERAL: PAINLEVEL_OUTOF10: 7

## 2025-08-07 ENCOUNTER — ANESTHESIA (OUTPATIENT)
Facility: HOSPITAL | Age: 75
End: 2025-08-07
Payer: MEDICARE

## 2025-08-07 ENCOUNTER — ANESTHESIA EVENT (OUTPATIENT)
Facility: HOSPITAL | Age: 75
End: 2025-08-07
Payer: MEDICARE

## 2025-08-07 ENCOUNTER — HOSPITAL ENCOUNTER (OUTPATIENT)
Facility: HOSPITAL | Age: 75
Setting detail: OUTPATIENT SURGERY
Discharge: HOME | End: 2025-08-07
Attending: ORTHOPAEDIC SURGERY | Admitting: ORTHOPAEDIC SURGERY
Payer: MEDICARE

## 2025-08-07 ENCOUNTER — APPOINTMENT (OUTPATIENT)
Facility: HOSPITAL | Age: 75
End: 2025-08-07
Attending: ORTHOPAEDIC SURGERY
Payer: MEDICARE

## 2025-08-07 VITALS
DIASTOLIC BLOOD PRESSURE: 52 MMHG | WEIGHT: 200 LBS | BODY MASS INDEX: 32.14 KG/M2 | HEIGHT: 66 IN | TEMPERATURE: 97.9 F | HEART RATE: 89 BPM | RESPIRATION RATE: 16 BRPM | OXYGEN SATURATION: 100 % | SYSTOLIC BLOOD PRESSURE: 108 MMHG

## 2025-08-07 PROCEDURE — 7100000010 HC PHASE II RECOVERY - FIRST 15 MIN: Performed by: ORTHOPAEDIC SURGERY

## 2025-08-07 PROCEDURE — C1713 ANCHOR/SCREW BN/BN,TIS/BN: HCPCS | Performed by: ORTHOPAEDIC SURGERY

## 2025-08-07 PROCEDURE — 6360000002 HC RX W HCPCS: Performed by: ORTHOPAEDIC SURGERY

## 2025-08-07 PROCEDURE — 3700000001 HC ADD 15 MINUTES (ANESTHESIA): Performed by: ORTHOPAEDIC SURGERY

## 2025-08-07 PROCEDURE — 2720000010 HC SURG SUPPLY STERILE: Performed by: ORTHOPAEDIC SURGERY

## 2025-08-07 PROCEDURE — 6370000000 HC RX 637 (ALT 250 FOR IP): Performed by: ANESTHESIOLOGY

## 2025-08-07 PROCEDURE — 2500000003 HC RX 250 WO HCPCS: Performed by: ANESTHESIOLOGY

## 2025-08-07 PROCEDURE — 2580000003 HC RX 258: Performed by: ANESTHESIOLOGY

## 2025-08-07 PROCEDURE — 7100000001 HC PACU RECOVERY - ADDTL 15 MIN: Performed by: ORTHOPAEDIC SURGERY

## 2025-08-07 PROCEDURE — 7100000000 HC PACU RECOVERY - FIRST 15 MIN: Performed by: ORTHOPAEDIC SURGERY

## 2025-08-07 PROCEDURE — 3600000014 HC SURGERY LEVEL 4 ADDTL 15MIN: Performed by: ORTHOPAEDIC SURGERY

## 2025-08-07 PROCEDURE — 2500000003 HC RX 250 WO HCPCS: Performed by: NURSE ANESTHETIST, CERTIFIED REGISTERED

## 2025-08-07 PROCEDURE — 7100000011 HC PHASE II RECOVERY - ADDTL 15 MIN: Performed by: ORTHOPAEDIC SURGERY

## 2025-08-07 PROCEDURE — 6360000002 HC RX W HCPCS: Performed by: NURSE ANESTHETIST, CERTIFIED REGISTERED

## 2025-08-07 PROCEDURE — 3700000000 HC ANESTHESIA ATTENDED CARE: Performed by: ORTHOPAEDIC SURGERY

## 2025-08-07 PROCEDURE — 3600000004 HC SURGERY LEVEL 4 BASE: Performed by: ORTHOPAEDIC SURGERY

## 2025-08-07 PROCEDURE — 2580000003 HC RX 258: Performed by: NURSE ANESTHETIST, CERTIFIED REGISTERED

## 2025-08-07 PROCEDURE — 2709999900 HC NON-CHARGEABLE SUPPLY: Performed by: ORTHOPAEDIC SURGERY

## 2025-08-07 DEVICE — IMPLANTABLE DEVICE: Type: IMPLANTABLE DEVICE | Site: ANKLE | Status: FUNCTIONAL

## 2025-08-07 DEVICE — SCREW BNE L16MM DIA3.5MM CORT TI LOK FULL THRD T10 DRV FOR: Type: IMPLANTABLE DEVICE | Site: ANKLE | Status: FUNCTIONAL

## 2025-08-07 DEVICE — PLATE BNE 3 H MED S STL LOK FOR ANK FRAC MGMT: Type: IMPLANTABLE DEVICE | Site: ANKLE | Status: FUNCTIONAL

## 2025-08-07 DEVICE — ANCHOR FIX DISP FOR ANK FRAC SYS BB-TAK: Type: IMPLANTABLE DEVICE | Site: ANKLE | Status: FUNCTIONAL

## 2025-08-07 DEVICE — SCREW BNE L35MM DIA3.5MM CORT ANK S STL NONLOCKING FULL: Type: IMPLANTABLE DEVICE | Site: ANKLE | Status: FUNCTIONAL

## 2025-08-07 RX ORDER — ONDANSETRON 2 MG/ML
INJECTION INTRAMUSCULAR; INTRAVENOUS
Status: DISCONTINUED | OUTPATIENT
Start: 2025-08-07 | End: 2025-08-07 | Stop reason: SDUPTHER

## 2025-08-07 RX ORDER — DROPERIDOL 2.5 MG/ML
0.62 INJECTION, SOLUTION INTRAMUSCULAR; INTRAVENOUS
Status: DISCONTINUED | OUTPATIENT
Start: 2025-08-07 | End: 2025-08-07 | Stop reason: HOSPADM

## 2025-08-07 RX ORDER — HYDROMORPHONE HYDROCHLORIDE 1 MG/ML
1 INJECTION, SOLUTION INTRAMUSCULAR; INTRAVENOUS; SUBCUTANEOUS EVERY 5 MIN PRN
Status: DISCONTINUED | OUTPATIENT
Start: 2025-08-07 | End: 2025-08-07 | Stop reason: HOSPADM

## 2025-08-07 RX ORDER — EPHEDRINE SULFATE/0.9% NACL/PF 25 MG/5 ML
SYRINGE (ML) INTRAVENOUS
Status: DISCONTINUED | OUTPATIENT
Start: 2025-08-07 | End: 2025-08-07 | Stop reason: SDUPTHER

## 2025-08-07 RX ORDER — PROPOFOL 10 MG/ML
INJECTION, EMULSION INTRAVENOUS
Status: DISCONTINUED | OUTPATIENT
Start: 2025-08-07 | End: 2025-08-07 | Stop reason: SDUPTHER

## 2025-08-07 RX ORDER — LIDOCAINE HYDROCHLORIDE 10 MG/ML
1 INJECTION, SOLUTION EPIDURAL; INFILTRATION; INTRACAUDAL; PERINEURAL
Status: DISCONTINUED | OUTPATIENT
Start: 2025-08-07 | End: 2025-08-07 | Stop reason: HOSPADM

## 2025-08-07 RX ORDER — HYDROMORPHONE HYDROCHLORIDE 2 MG/ML
INJECTION, SOLUTION INTRAMUSCULAR; INTRAVENOUS; SUBCUTANEOUS
Status: DISCONTINUED | OUTPATIENT
Start: 2025-08-07 | End: 2025-08-07 | Stop reason: SDUPTHER

## 2025-08-07 RX ORDER — OXYCODONE HYDROCHLORIDE 5 MG/1
5 TABLET ORAL
Status: DISCONTINUED | OUTPATIENT
Start: 2025-08-07 | End: 2025-08-07 | Stop reason: HOSPADM

## 2025-08-07 RX ORDER — LABETALOL HYDROCHLORIDE 5 MG/ML
10 INJECTION, SOLUTION INTRAVENOUS
Status: DISCONTINUED | OUTPATIENT
Start: 2025-08-07 | End: 2025-08-07 | Stop reason: HOSPADM

## 2025-08-07 RX ORDER — DIPHENHYDRAMINE HYDROCHLORIDE 50 MG/ML
12.5 INJECTION, SOLUTION INTRAMUSCULAR; INTRAVENOUS
Status: DISCONTINUED | OUTPATIENT
Start: 2025-08-07 | End: 2025-08-07 | Stop reason: HOSPADM

## 2025-08-07 RX ORDER — MEPERIDINE HYDROCHLORIDE 25 MG/ML
12.5 INJECTION INTRAMUSCULAR; INTRAVENOUS; SUBCUTANEOUS EVERY 5 MIN PRN
Status: DISCONTINUED | OUTPATIENT
Start: 2025-08-07 | End: 2025-08-07 | Stop reason: HOSPADM

## 2025-08-07 RX ORDER — SODIUM CHLORIDE, SODIUM LACTATE, POTASSIUM CHLORIDE, CALCIUM CHLORIDE 600; 310; 30; 20 MG/100ML; MG/100ML; MG/100ML; MG/100ML
INJECTION, SOLUTION INTRAVENOUS CONTINUOUS
Status: DISCONTINUED | OUTPATIENT
Start: 2025-08-07 | End: 2025-08-07 | Stop reason: HOSPADM

## 2025-08-07 RX ORDER — CEFAZOLIN SODIUM 1 G/3ML
INJECTION, POWDER, FOR SOLUTION INTRAMUSCULAR; INTRAVENOUS
Status: DISCONTINUED | OUTPATIENT
Start: 2025-08-07 | End: 2025-08-07 | Stop reason: SDUPTHER

## 2025-08-07 RX ORDER — SUCCINYLCHOLINE/SOD CL,ISO/PF 100 MG/5ML
SYRINGE (ML) INTRAVENOUS
Status: DISCONTINUED | OUTPATIENT
Start: 2025-08-07 | End: 2025-08-07 | Stop reason: SDUPTHER

## 2025-08-07 RX ORDER — ACETAMINOPHEN 325 MG/1
650 TABLET ORAL ONCE
Status: COMPLETED | OUTPATIENT
Start: 2025-08-07 | End: 2025-08-07

## 2025-08-07 RX ORDER — ROCURONIUM BROMIDE 10 MG/ML
INJECTION, SOLUTION INTRAVENOUS
Status: DISCONTINUED | OUTPATIENT
Start: 2025-08-07 | End: 2025-08-07 | Stop reason: SDUPTHER

## 2025-08-07 RX ORDER — IPRATROPIUM BROMIDE AND ALBUTEROL SULFATE 2.5; .5 MG/3ML; MG/3ML
1 SOLUTION RESPIRATORY (INHALATION)
Status: DISCONTINUED | OUTPATIENT
Start: 2025-08-07 | End: 2025-08-07 | Stop reason: HOSPADM

## 2025-08-07 RX ORDER — DEXAMETHASONE SODIUM PHOSPHATE 4 MG/ML
INJECTION, SOLUTION INTRA-ARTICULAR; INTRALESIONAL; INTRAMUSCULAR; INTRAVENOUS; SOFT TISSUE
Status: DISCONTINUED | OUTPATIENT
Start: 2025-08-07 | End: 2025-08-07 | Stop reason: SDUPTHER

## 2025-08-07 RX ORDER — ALBUTEROL SULFATE 0.83 MG/ML
2.5 SOLUTION RESPIRATORY (INHALATION)
Status: DISCONTINUED | OUTPATIENT
Start: 2025-08-07 | End: 2025-08-07 | Stop reason: HOSPADM

## 2025-08-07 RX ORDER — PHENYLEPHRINE HYDROCHLORIDE 10 MG/ML
INJECTION INTRAVENOUS
Status: DISCONTINUED | OUTPATIENT
Start: 2025-08-07 | End: 2025-08-07

## 2025-08-07 RX ADMIN — PROPOFOL 120 MG: 10 INJECTION, EMULSION INTRAVENOUS at 09:16

## 2025-08-07 RX ADMIN — PHENYLEPHRINE HYDROCHLORIDE 30 MCG/MIN: 10 INJECTION INTRAVENOUS at 09:51

## 2025-08-07 RX ADMIN — SODIUM CHLORIDE, SODIUM LACTATE, POTASSIUM CHLORIDE, AND CALCIUM CHLORIDE: .6; .31; .03; .02 INJECTION, SOLUTION INTRAVENOUS at 07:57

## 2025-08-07 RX ADMIN — PHENYLEPHRINE HYDROCHLORIDE 100 MCG: 10 INJECTION INTRAVENOUS at 09:34

## 2025-08-07 RX ADMIN — ACETAMINOPHEN 650 MG: 325 TABLET ORAL at 07:58

## 2025-08-07 RX ADMIN — ROCURONIUM BROMIDE 5 MG: 10 INJECTION, SOLUTION INTRAVENOUS at 09:16

## 2025-08-07 RX ADMIN — Medication 20 MG: at 09:16

## 2025-08-07 RX ADMIN — DEXAMETHASONE SODIUM PHOSPHATE 4 MG: 4 INJECTION, SOLUTION INTRAMUSCULAR; INTRAVENOUS at 09:25

## 2025-08-07 RX ADMIN — Medication 100 MG: at 09:17

## 2025-08-07 RX ADMIN — CEFAZOLIN 2 G: 1 INJECTION, POWDER, FOR SOLUTION INTRAMUSCULAR; INTRAVENOUS at 09:19

## 2025-08-07 RX ADMIN — PROPOFOL 30 MG: 10 INJECTION, EMULSION INTRAVENOUS at 10:31

## 2025-08-07 RX ADMIN — HYDROMORPHONE HYDROCHLORIDE 0.2 MG: 2 INJECTION, SOLUTION INTRAMUSCULAR; INTRAVENOUS; SUBCUTANEOUS at 10:31

## 2025-08-07 RX ADMIN — ROCURONIUM BROMIDE 45 MG: 10 INJECTION, SOLUTION INTRAVENOUS at 09:27

## 2025-08-07 RX ADMIN — HYDROMORPHONE HYDROCHLORIDE 1 MG: 1 INJECTION, SOLUTION INTRAMUSCULAR; INTRAVENOUS; SUBCUTANEOUS at 11:25

## 2025-08-07 RX ADMIN — HYDROMORPHONE HYDROCHLORIDE 0.4 MG: 2 INJECTION, SOLUTION INTRAMUSCULAR; INTRAVENOUS; SUBCUTANEOUS at 09:16

## 2025-08-07 RX ADMIN — SUGAMMADEX 200 MG: 100 INJECTION, SOLUTION INTRAVENOUS at 10:38

## 2025-08-07 RX ADMIN — ONDANSETRON 4 MG: 2 INJECTION, SOLUTION INTRAMUSCULAR; INTRAVENOUS at 10:18

## 2025-08-07 RX ADMIN — EPHEDRINE SULFATE 10 MG: 5 INJECTION INTRAVENOUS at 09:33

## 2025-08-07 RX ADMIN — PHENYLEPHRINE HYDROCHLORIDE 100 MCG: 10 INJECTION INTRAVENOUS at 09:29

## 2025-08-07 ASSESSMENT — PAIN DESCRIPTION - FREQUENCY
FREQUENCY: CONTINUOUS

## 2025-08-07 ASSESSMENT — COPD QUESTIONNAIRES: CAT_SEVERITY: MODERATE

## 2025-08-07 ASSESSMENT — PAIN DESCRIPTION - LOCATION
LOCATION: ANKLE
LOCATION: ANKLE
LOCATION: FOOT
LOCATION: ANKLE
LOCATION: ANKLE

## 2025-08-07 ASSESSMENT — PAIN DESCRIPTION - ONSET
ONSET: ON-GOING

## 2025-08-07 ASSESSMENT — PAIN DESCRIPTION - PAIN TYPE
TYPE: SURGICAL PAIN

## 2025-08-07 ASSESSMENT — PAIN DESCRIPTION - DESCRIPTORS
DESCRIPTORS: ACHING

## 2025-08-07 ASSESSMENT — PAIN DESCRIPTION - ORIENTATION
ORIENTATION: RIGHT

## 2025-08-07 ASSESSMENT — PAIN - FUNCTIONAL ASSESSMENT
PAIN_FUNCTIONAL_ASSESSMENT: ACTIVITIES ARE NOT PREVENTED
PAIN_FUNCTIONAL_ASSESSMENT: NONE - DENIES PAIN

## 2025-08-07 ASSESSMENT — PAIN SCALES - GENERAL
PAINLEVEL_OUTOF10: 3
PAINLEVEL_OUTOF10: 6
PAINLEVEL_OUTOF10: 8
PAINLEVEL_OUTOF10: 9
PAINLEVEL_OUTOF10: 5

## 2025-08-07 ASSESSMENT — ENCOUNTER SYMPTOMS: SHORTNESS OF BREATH: 1

## 2025-08-19 ENCOUNTER — PATIENT MESSAGE (OUTPATIENT)
Age: 75
End: 2025-08-19

## 2025-08-27 ENCOUNTER — ANESTHESIA EVENT (OUTPATIENT)
Facility: HOSPITAL | Age: 75
End: 2025-08-27
Payer: MEDICARE

## 2025-08-28 ENCOUNTER — ANESTHESIA (OUTPATIENT)
Facility: HOSPITAL | Age: 75
End: 2025-08-28
Payer: MEDICARE

## 2025-08-28 ENCOUNTER — APPOINTMENT (OUTPATIENT)
Facility: HOSPITAL | Age: 75
End: 2025-08-28
Attending: ORTHOPAEDIC SURGERY
Payer: MEDICARE

## 2025-08-28 ENCOUNTER — HOSPITAL ENCOUNTER (OUTPATIENT)
Facility: HOSPITAL | Age: 75
Setting detail: OUTPATIENT SURGERY
Discharge: HOME | End: 2025-08-28
Attending: ORTHOPAEDIC SURGERY | Admitting: ORTHOPAEDIC SURGERY
Payer: MEDICARE

## 2025-08-28 VITALS
DIASTOLIC BLOOD PRESSURE: 54 MMHG | SYSTOLIC BLOOD PRESSURE: 101 MMHG | TEMPERATURE: 97.9 F | HEIGHT: 66 IN | HEART RATE: 80 BPM | RESPIRATION RATE: 13 BRPM | WEIGHT: 202.6 LBS | OXYGEN SATURATION: 100 % | BODY MASS INDEX: 32.56 KG/M2

## 2025-08-28 PROCEDURE — 6360000002 HC RX W HCPCS: Performed by: ANESTHESIOLOGY

## 2025-08-28 PROCEDURE — 6370000000 HC RX 637 (ALT 250 FOR IP): Performed by: ORTHOPAEDIC SURGERY

## 2025-08-28 PROCEDURE — 6360000002 HC RX W HCPCS: Performed by: ORTHOPAEDIC SURGERY

## 2025-08-28 PROCEDURE — 64445 NJX AA&/STRD SCIATIC NRV IMG: CPT | Performed by: ANESTHESIOLOGY

## 2025-08-28 PROCEDURE — 7100000010 HC PHASE II RECOVERY - FIRST 15 MIN: Performed by: ORTHOPAEDIC SURGERY

## 2025-08-28 PROCEDURE — C1713 ANCHOR/SCREW BN/BN,TIS/BN: HCPCS | Performed by: ORTHOPAEDIC SURGERY

## 2025-08-28 PROCEDURE — 2500000003 HC RX 250 WO HCPCS: Performed by: ORTHOPAEDIC SURGERY

## 2025-08-28 PROCEDURE — 3700000001 HC ADD 15 MINUTES (ANESTHESIA): Performed by: ORTHOPAEDIC SURGERY

## 2025-08-28 PROCEDURE — 3700000000 HC ANESTHESIA ATTENDED CARE: Performed by: ORTHOPAEDIC SURGERY

## 2025-08-28 PROCEDURE — 2580000003 HC RX 258: Performed by: ANESTHESIOLOGY

## 2025-08-28 PROCEDURE — 3600000004 HC SURGERY LEVEL 4 BASE: Performed by: ORTHOPAEDIC SURGERY

## 2025-08-28 PROCEDURE — 2709999900 HC NON-CHARGEABLE SUPPLY: Performed by: ORTHOPAEDIC SURGERY

## 2025-08-28 PROCEDURE — C1769 GUIDE WIRE: HCPCS | Performed by: ORTHOPAEDIC SURGERY

## 2025-08-28 PROCEDURE — 7100000001 HC PACU RECOVERY - ADDTL 15 MIN: Performed by: ORTHOPAEDIC SURGERY

## 2025-08-28 PROCEDURE — 7100000011 HC PHASE II RECOVERY - ADDTL 15 MIN: Performed by: ORTHOPAEDIC SURGERY

## 2025-08-28 PROCEDURE — 7100000000 HC PACU RECOVERY - FIRST 15 MIN: Performed by: ORTHOPAEDIC SURGERY

## 2025-08-28 PROCEDURE — 6360000002 HC RX W HCPCS: Performed by: NURSE ANESTHETIST, CERTIFIED REGISTERED

## 2025-08-28 PROCEDURE — 3600000014 HC SURGERY LEVEL 4 ADDTL 15MIN: Performed by: ORTHOPAEDIC SURGERY

## 2025-08-28 PROCEDURE — E0749 ELEC OSTEOGEN STIM IMPLANTED: HCPCS | Performed by: ORTHOPAEDIC SURGERY

## 2025-08-28 DEVICE — GRAFT BNE SUB 5CC VIABLE MTRX COMPRESSIBLE MOLD RDY TO USE: Type: IMPLANTABLE DEVICE | Site: ANKLE | Status: FUNCTIONAL

## 2025-08-28 DEVICE — IMPLANTABLE DEVICE: Type: IMPLANTABLE DEVICE | Site: ANKLE | Status: FUNCTIONAL

## 2025-08-28 RX ORDER — NOREPINEPHRINE BITARTRATE/D5W 4MG/250ML
PLASTIC BAG, INJECTION (ML) INTRAVENOUS
Status: DISCONTINUED | OUTPATIENT
Start: 2025-08-28 | End: 2025-08-28 | Stop reason: SDUPTHER

## 2025-08-28 RX ORDER — BUPIVACAINE HYDROCHLORIDE 2.5 MG/ML
INJECTION, SOLUTION EPIDURAL; INFILTRATION; INTRACAUDAL; PERINEURAL
Status: DISCONTINUED | OUTPATIENT
Start: 2025-08-28 | End: 2025-08-28 | Stop reason: SDUPTHER

## 2025-08-28 RX ORDER — GABAPENTIN 300 MG/1
300 CAPSULE ORAL ONCE
Status: DISCONTINUED | OUTPATIENT
Start: 2025-08-28 | End: 2025-08-28 | Stop reason: HOSPADM

## 2025-08-28 RX ORDER — ONDANSETRON 2 MG/ML
4 INJECTION INTRAMUSCULAR; INTRAVENOUS
Status: DISCONTINUED | OUTPATIENT
Start: 2025-08-28 | End: 2025-08-28 | Stop reason: HOSPADM

## 2025-08-28 RX ORDER — ACETAMINOPHEN 325 MG/1
650 TABLET ORAL ONCE
Status: COMPLETED | OUTPATIENT
Start: 2025-08-28 | End: 2025-08-28

## 2025-08-28 RX ORDER — GLYCOPYRROLATE 0.2 MG/ML
INJECTION INTRAMUSCULAR; INTRAVENOUS
Status: DISCONTINUED | OUTPATIENT
Start: 2025-08-28 | End: 2025-08-28 | Stop reason: SDUPTHER

## 2025-08-28 RX ORDER — ACETAMINOPHEN 325 MG/1
650 TABLET ORAL
Status: COMPLETED | OUTPATIENT
Start: 2025-08-28 | End: 2025-08-28

## 2025-08-28 RX ORDER — DIPHENHYDRAMINE HYDROCHLORIDE 50 MG/ML
12.5 INJECTION, SOLUTION INTRAMUSCULAR; INTRAVENOUS
Status: DISCONTINUED | OUTPATIENT
Start: 2025-08-28 | End: 2025-08-28 | Stop reason: HOSPADM

## 2025-08-28 RX ORDER — PROPOFOL 10 MG/ML
INJECTION, EMULSION INTRAVENOUS
Status: DISCONTINUED | OUTPATIENT
Start: 2025-08-28 | End: 2025-08-28 | Stop reason: SDUPTHER

## 2025-08-28 RX ORDER — LIDOCAINE HYDROCHLORIDE 10 MG/ML
1 INJECTION, SOLUTION EPIDURAL; INFILTRATION; INTRACAUDAL; PERINEURAL
Status: DISCONTINUED | OUTPATIENT
Start: 2025-08-28 | End: 2025-08-28 | Stop reason: HOSPADM

## 2025-08-28 RX ORDER — SODIUM CHLORIDE, SODIUM LACTATE, POTASSIUM CHLORIDE, CALCIUM CHLORIDE 600; 310; 30; 20 MG/100ML; MG/100ML; MG/100ML; MG/100ML
INJECTION, SOLUTION INTRAVENOUS CONTINUOUS
Status: DISCONTINUED | OUTPATIENT
Start: 2025-08-28 | End: 2025-08-28 | Stop reason: HOSPADM

## 2025-08-28 RX ORDER — FENTANYL CITRATE 50 UG/ML
100 INJECTION, SOLUTION INTRAMUSCULAR; INTRAVENOUS
Status: COMPLETED | OUTPATIENT
Start: 2025-08-28 | End: 2025-08-28

## 2025-08-28 RX ORDER — KETOROLAC TROMETHAMINE 30 MG/ML
INJECTION, SOLUTION INTRAMUSCULAR; INTRAVENOUS
Status: DISCONTINUED | OUTPATIENT
Start: 2025-08-28 | End: 2025-08-28 | Stop reason: SDUPTHER

## 2025-08-28 RX ORDER — ROPIVACAINE HYDROCHLORIDE 5 MG/ML
INJECTION, SOLUTION EPIDURAL; INFILTRATION; PERINEURAL
Status: DISCONTINUED | OUTPATIENT
Start: 2025-08-28 | End: 2025-08-28 | Stop reason: SDUPTHER

## 2025-08-28 RX ORDER — ASPIRIN 81 MG/1
81 TABLET, CHEWABLE ORAL 2 TIMES DAILY
COMMUNITY

## 2025-08-28 RX ORDER — MIDAZOLAM HYDROCHLORIDE 1 MG/ML
INJECTION, SOLUTION INTRAMUSCULAR; INTRAVENOUS
Status: DISCONTINUED | OUTPATIENT
Start: 2025-08-28 | End: 2025-08-28

## 2025-08-28 RX ORDER — MIDAZOLAM HYDROCHLORIDE 2 MG/2ML
2 INJECTION, SOLUTION INTRAMUSCULAR; INTRAVENOUS
Status: COMPLETED | OUTPATIENT
Start: 2025-08-28 | End: 2025-08-28

## 2025-08-28 RX ADMIN — KETOROLAC TROMETHAMINE 15 MG: 30 INJECTION, SOLUTION INTRAMUSCULAR at 14:07

## 2025-08-28 RX ADMIN — CEFAZOLIN SODIUM 2000 MG: 1 POWDER, FOR SOLUTION INTRAMUSCULAR; INTRAVENOUS at 13:13

## 2025-08-28 RX ADMIN — Medication 8 MCG: at 14:18

## 2025-08-28 RX ADMIN — Medication 8 MCG: at 13:13

## 2025-08-28 RX ADMIN — ACETAMINOPHEN 650 MG: 325 TABLET ORAL at 11:27

## 2025-08-28 RX ADMIN — MIDAZOLAM HYDROCHLORIDE 2 MG: 1 INJECTION, SOLUTION INTRAMUSCULAR; INTRAVENOUS at 12:26

## 2025-08-28 RX ADMIN — PROPOFOL 50 MCG/KG/MIN: 10 INJECTION, EMULSION INTRAVENOUS at 13:15

## 2025-08-28 RX ADMIN — FENTANYL CITRATE 25 MCG: 50 INJECTION, SOLUTION INTRAMUSCULAR; INTRAVENOUS at 14:01

## 2025-08-28 RX ADMIN — SODIUM CHLORIDE, SODIUM LACTATE, POTASSIUM CHLORIDE, AND CALCIUM CHLORIDE: .6; .31; .03; .02 INJECTION, SOLUTION INTRAVENOUS at 11:43

## 2025-08-28 RX ADMIN — ACETAMINOPHEN 650 MG: 325 TABLET ORAL at 15:12

## 2025-08-28 RX ADMIN — FENTANYL CITRATE 25 MCG: 50 INJECTION, SOLUTION INTRAMUSCULAR; INTRAVENOUS at 13:38

## 2025-08-28 RX ADMIN — Medication 2 MCG/MIN: at 13:12

## 2025-08-28 RX ADMIN — FENTANYL CITRATE 50 MCG: 50 INJECTION, SOLUTION INTRAMUSCULAR; INTRAVENOUS at 14:16

## 2025-08-28 RX ADMIN — GLYCOPYRROLATE 0.2 MG: 0.2 INJECTION INTRAMUSCULAR; INTRAVENOUS at 12:58

## 2025-08-28 RX ADMIN — BUPIVACAINE HYDROCHLORIDE 20 ML: 2.5 INJECTION, SOLUTION EPIDURAL; INFILTRATION; INTRACAUDAL; PERINEURAL at 12:36

## 2025-08-28 RX ADMIN — FENTANYL CITRATE 100 MCG: 50 INJECTION, SOLUTION INTRAMUSCULAR; INTRAVENOUS at 12:26

## 2025-08-28 RX ADMIN — BUPIVACAINE 10 ML: 13.3 INJECTION, SUSPENSION, LIPOSOMAL INFILTRATION at 12:36

## 2025-08-28 RX ADMIN — ROPIVACAINE HYDROCHLORIDE 10 ML: 5 INJECTION, SOLUTION EPIDURAL; INFILTRATION; PERINEURAL at 12:46

## 2025-08-28 RX ADMIN — PROPOFOL 10 MG: 10 INJECTION, EMULSION INTRAVENOUS at 14:01

## 2025-08-28 ASSESSMENT — PAIN DESCRIPTION - FREQUENCY: FREQUENCY: CONTINUOUS

## 2025-08-28 ASSESSMENT — PAIN - FUNCTIONAL ASSESSMENT
PAIN_FUNCTIONAL_ASSESSMENT: 0-10
PAIN_FUNCTIONAL_ASSESSMENT: 0-10
PAIN_FUNCTIONAL_ASSESSMENT: PREVENTS OR INTERFERES SOME ACTIVE ACTIVITIES AND ADLS

## 2025-08-28 ASSESSMENT — PAIN DESCRIPTION - ORIENTATION: ORIENTATION: RIGHT

## 2025-08-28 ASSESSMENT — PAIN SCALES - GENERAL
PAINLEVEL_OUTOF10: 5
PAINLEVEL_OUTOF10: 0

## 2025-08-28 ASSESSMENT — PAIN DESCRIPTION - PAIN TYPE: TYPE: SURGICAL PAIN

## 2025-08-28 ASSESSMENT — PAIN DESCRIPTION - DESCRIPTORS
DESCRIPTORS: ACHING;THROBBING;SHARP
DESCRIPTORS: ACHING

## 2025-08-28 ASSESSMENT — ENCOUNTER SYMPTOMS: SHORTNESS OF BREATH: 1

## 2025-08-28 ASSESSMENT — COPD QUESTIONNAIRES: CAT_SEVERITY: MODERATE

## 2025-08-28 ASSESSMENT — PAIN DESCRIPTION - LOCATION: LOCATION: ANKLE

## 2025-08-28 ASSESSMENT — PAIN DESCRIPTION - ONSET: ONSET: GRADUAL

## (undated) DEVICE — SYRINGE MED 20ML STD CLR PLAS LUERLOCK TIP N CTRL DISP

## (undated) DEVICE — Device

## (undated) DEVICE — BAG BELONG PT PERS CLEAR HANDL

## (undated) DEVICE — CANISTER SUCT 3000CC RIG LOK SNAP ON LID W/ STD EL SUCT PRT

## (undated) DEVICE — SUTURE ETHILON SZ 3-0 L18IN NONABSORBABLE BLK PS-2 L19MM 3/8 1669H

## (undated) DEVICE — STERILE-Z SURGICAL PATIENT COVERS CLEAR POLYETHYLENE STERILE UNIVERSAL FIT 10 PER CASE: Brand: STERILE-Z

## (undated) DEVICE — Z DISCONTINUED NO SUB IDED SET EXTN W/ 4 W STPCOCK M SPIN LOK 36IN

## (undated) DEVICE — AIRLIFE™ U/CONNECT-IT OXYGEN TUBING 7 FEET (2.1 M) CRUSH-RESISTANT OXYGEN TUBING, VINYL TIPPED: Brand: AIRLIFE™

## (undated) DEVICE — GLOVE SURG SZ 85 L12IN FNGR THK79MIL GRN LTX FREE

## (undated) DEVICE — 450 ML BOTTLE OF 0.05% CHLORHEXIDINE GLUCONATE IN 99.95% STERILE WATER FOR IRRIGATION, USP AND APPLICATOR.: Brand: IRRISEPT ANTIMICROBIAL WOUND LAVAGE

## (undated) DEVICE — BANDAGE COMPR W6INXL10YD ST M E WHITE/BEIGE

## (undated) DEVICE — GLOVE ORTHO 8   MSG9480

## (undated) DEVICE — 1200 GUARD II KIT W/5MM TUBE W/O VAC TUBE: Brand: GUARDIAN

## (undated) DEVICE — PICO 7 10CM X 30CM: Brand: PICO™ 7

## (undated) DEVICE — STRIP,CLOSURE,WOUND,MEDI-STRIP,1/2X4: Brand: MEDLINE

## (undated) DEVICE — SNARE ENDOSCP M L240CM W27MM SHTH DIA2.4MM CHN 2.8MM OVL

## (undated) DEVICE — SPONGE GZ W4XL4IN COT RADPQ HIGHLY ABSRB

## (undated) DEVICE — SOLUTION SCRB 4% CHG RED ANTIMIC SKIN CLN PREOPERATIVE DISP

## (undated) DEVICE — TOTAL TRAY, 16FR 10ML SIL FOLEY, URN: Brand: MEDLINE

## (undated) DEVICE — 3M™ TEGADERM™ TRANSPARENT FILM DRESSING FRAME STYLE, 1626, 4 IN X 4-3/4 IN (10 CM X 12 CM), 50/CT 4CT/CASE: Brand: 3M™ TEGADERM™

## (undated) DEVICE — TRAP,MUCUS SPECIMEN, 80CC: Brand: MEDLINE

## (undated) DEVICE — TIP CLEANER: Brand: VALLEYLAB

## (undated) DEVICE — SOLUTION IRRIG 1000ML H2O PIC PLAS SHATTERPROOF CONTAINER

## (undated) DEVICE — GLOVE SURG SZ 85 L12IN THK75MIL DK GRN LTX FREE

## (undated) DEVICE — KENDALL RADIOLUCENT FOAM MONITORING ELECTRODE -RECTANGULAR SHAPE: Brand: KENDALL

## (undated) DEVICE — TAPE ADH W3INXL10YD CLTH SILK H2O RESIST CURAD

## (undated) DEVICE — TOWEL SURG W17XL27IN STD BLU COT NONFENESTRATED PREWASHED

## (undated) DEVICE — CONTAINER SPEC 20 ML LID NEUT BUFF FORMALIN 10 % POLYPR STS

## (undated) DEVICE — STIMULATOR BONE GROWTH EXT ELECTROMAGNETIC CLAV SHLDR PRTBL

## (undated) DEVICE — NEEDLE HYPO 18GA L1.5IN PNK S STL HUB POLYPR SHLD REG BVL

## (undated) DEVICE — POWDER HEMOSTAT GEL 3.0GR -- SURGICEL

## (undated) DEVICE — KIT EVAC 0.13IN RECT TB DIA10FR 400CC PVC 3 SPR Y CONN DRN

## (undated) DEVICE — SUTURE VICRYL SZ 0 L36IN ABSRB UD L36MM CT-1 1/2 CIR J946H

## (undated) DEVICE — GUIDEWIRE ORTH L150MM DIA0.053IN W/ TRCR TIP FOR ANK FRAC

## (undated) DEVICE — ENDO CARRY-ON PROCEDURE KIT INCLUDES ENZYMATIC SPONGE, GAUZE, BIOHAZARD LABEL, TRAY, LUBRICANT, DIRTY SCOPE LABEL, WATER LABEL, TRAY, DRAWSTRING PAD, AND DEFENDO 4-PIECE KIT.: Brand: ENDO CARRY-ON PROCEDURE KIT

## (undated) DEVICE — Z DISCONTINUED USE 2751540 TUBING IRRIG L10IN DISP PMP ENDOGATOR

## (undated) DEVICE — CANN NASAL O2 CAPNOGRAPHY AD -- FILTERLINE

## (undated) DEVICE — BIT DRL DIA2.5MM FOR ANK FRAC MGMT SYS

## (undated) DEVICE — DRAPE,LAP,CHOLE,W/TROUGHS,STERILE: Brand: MEDLINE

## (undated) DEVICE — GLOVE ORANGE PI 7 1/2   MSG9075

## (undated) DEVICE — SPLINT ORTH W5XL30IN PLSTR OF PARIS LO EXOTHERM SMOOTH

## (undated) DEVICE — YANKAUER,OPEN TIP,W/O VENT,STERILE: Brand: MEDLINE INDUSTRIES, INC.

## (undated) DEVICE — SUTURE VICRYL SZ 0 L36IN ABSRB UD CT-1 L36MM 1/2 CIR TAPR PNT VCP946H

## (undated) DEVICE — Device: Brand: JELCO

## (undated) DEVICE — KIT ARMOR C DRP COLLAPSIBLE AND SELF EXP TOP CVR FOR FLUOROSCOPIC

## (undated) DEVICE — UNDERPAD HOSP W30XL36IN WHT SUP ABSRB POLYMER AIR PERM DISP

## (undated) DEVICE — PADDING CAST W6INXL4YD ST COT RAYON MICROPLEATED HIGHLY

## (undated) DEVICE — BANDAGE COMPR W6INXL12FT SMOOTH FOR LIMB EXSANG ESMARCH

## (undated) DEVICE — TRAY PREP DRY W/ PREM GLV 2 APPL 6 SPNG 2 UNDPD 1 OVERWRAP

## (undated) DEVICE — SUTURE MONOCRYL SZ 3-0 L27IN ABSRB UD L19MM PS-2 3/8 CIR PRIM Y427H

## (undated) DEVICE — SUTURE MONOCRYL SZ 3-0 L27IN ABSRB UD PS-2 3/8 CIR REV CUT NDL MCP427H

## (undated) DEVICE — TAPE,ELASTIC,FOAM,CURAD,3"X5.5YD,LF: Brand: CURAD

## (undated) DEVICE — SOLUTION IRRIG 1000ML 0.9% SOD CHL USP POUR PLAS BTL

## (undated) DEVICE — SOLUTION IRRIG 1000ML STRL H2O USP PLAS POUR BTL

## (undated) DEVICE — ALCOHOL RUBBING ISO 16OZ 70%

## (undated) DEVICE — TRAP SURG QUAD PARABOLA SLOT DSGN SFTY SCRN TRAPEASE

## (undated) DEVICE — SUTURE ABSORBABLE 3-0 PS-1 18 IN UD MONOCRYL + STRATAFIX SXMP1B102

## (undated) DEVICE — SPONGE GZ W4XL4IN COT 12 PLY TYP VII WVN C FLD DSGN

## (undated) DEVICE — COVER LT HNDL PLAS RIG 1 PER PK

## (undated) DEVICE — NEONATAL-ADULT SPO2 SENSOR: Brand: NELLCOR

## (undated) DEVICE — SOLIDIFIER FLUID 3000 CC ABSORB

## (undated) DEVICE — SEALANT HEMOSTAT W/THROM 8ML -- SURGIFLO MATRIX

## (undated) DEVICE — Device: Brand: MEDICAL ACTION INDUSTRIES

## (undated) DEVICE — SPONGE GZ W4XL4IN COT 12 PLY TYP VII WVN C FLD DSGN STERILE

## (undated) DEVICE — DERMABOND SKIN ADH 0.7ML -- DERMABOND ADVANCED 12/BX

## (undated) DEVICE — STRAP POS FOAM SFT STR FOR KNEE BODY

## (undated) DEVICE — PROBE BALL TIP STIMULATING 25

## (undated) DEVICE — NEEDLE HYPO 22GA L1.5IN BLK POLYPR HUB S STL REG BVL STR

## (undated) DEVICE — STIMULATOR BONE GROWTH ELEC NONINVASIVE FOR OSTEOGENESIS

## (undated) DEVICE — SPONGE: SPECIALTY PEANUT XR 100/CS: Brand: MEDICAL ACTION INDUSTRIES

## (undated) DEVICE — BAG SPEC BIOHZD LF 2MIL 6X10IN -- CONVERT TO ITEM 357326

## (undated) DEVICE — CATH IV AUTOGRD BC BLU 22GA 25 -- INSYTE

## (undated) DEVICE — 1010 S-DRAPE TOWEL DRAPE 10/BX: Brand: STERI-DRAPE™

## (undated) DEVICE — SYR 50ML SLIP TIP NSAF LF STRL --

## (undated) DEVICE — JACKSON TABLE POSITIONER KIT: Brand: MEDLINE INDUSTRIES, INC.

## (undated) DEVICE — PIN FIX THRD DISP BB-TAK

## (undated) DEVICE — PAD ABD W5XL9IN GEN USE NONADHESIVE EXTRA ABSRB SFT

## (undated) DEVICE — FORCEPS BX L240CM JAW DIA2.8MM L CAP W/ NDL MIC MESH TOOTH

## (undated) DEVICE — SET ADMIN 16ML TBNG L100IN 2 Y INJ SITE IV PIGGY BK DISP

## (undated) DEVICE — REM POLYHESIVE ADULT PATIENT RETURN ELECTRODE: Brand: VALLEYLAB

## (undated) DEVICE — C-ARM: Brand: UNBRANDED

## (undated) DEVICE — SPHERE STEALTH 12PK/TY --

## (undated) DEVICE — SUTURE STRATAFIX SYMMETRIC SZ 1 L18IN ABSRB VLT CT1 L36CM SXPP1A404

## (undated) DEVICE — LAMINECTOMY-SFMC: Brand: MEDLINE INDUSTRIES, INC.

## (undated) DEVICE — GLOVE SURG SZ 65 THK91MIL LTX FREE SYN POLYISOPRENE

## (undated) DEVICE — BONE MARROW KIT ASPIR 11 GA

## (undated) DEVICE — CANISTER, RIGID, 3000CC: Brand: MEDLINE INDUSTRIES, INC.

## (undated) DEVICE — TOOL 14MH30 LEGEND 14CM 3MM: Brand: MIDAS REX ™

## (undated) DEVICE — SUTURE VCRL SZ 2-0 L27IN ABSRB UD L26MM CT-2 1/2 CIR J269H

## (undated) DEVICE — GOWN,SIRUS,NONRNF,SETINSLV,2XL,18/CS: Brand: MEDLINE

## (undated) DEVICE — DRESSING PETRO W5XL9IN 3% BISMUTH TRIBROMOPHENATE XRFRM

## (undated) DEVICE — QUILTED PREMIUM COMFORT UNDERPAD,EXTRA HEAVY: Brand: WINGS

## (undated) DEVICE — GLOVE SURG SZ 7 L12IN FNGR THK79MIL GRN LTX FREE

## (undated) DEVICE — CONNECTOR TBNG AUX H2O JET DISP FOR OLY 160/180 SER

## (undated) DEVICE — BW-412T DISP COMBO CLEANING BRUSH: Brand: SINGLE USE COMBINATION CLEANING BRUSH

## (undated) DEVICE — AEGIS 1" DISK 4MM HOLE, PEEL OPEN: Brand: MEDLINE